# Patient Record
Sex: FEMALE | Race: WHITE | NOT HISPANIC OR LATINO | URBAN - METROPOLITAN AREA
[De-identification: names, ages, dates, MRNs, and addresses within clinical notes are randomized per-mention and may not be internally consistent; named-entity substitution may affect disease eponyms.]

---

## 2018-07-23 ENCOUNTER — HOSPITAL ENCOUNTER (OUTPATIENT)
Dept: FAMILY MEDICINE CLINIC | Facility: CLINIC | Age: 43
Setting detail: SPECIMEN
Discharge: HOME | End: 2018-07-23
Attending: FAMILY MEDICINE | Admitting: FAMILY MEDICINE

## 2018-07-23 LAB
ALBUMIN SERPL-MCNC: 3.9 G/DL (ref 3.5–4.8)
ALBUMIN/GLOB SERPL: 1.4 {RATIO} (ref 1–1.7)
ALP SERPL-CCNC: 48 IU/L (ref 32–91)
ALT SERPL-CCNC: 14 IU/L (ref 14–54)
ANION GAP SERPL CALC-SCNC: 9.4 MMOL/L (ref 10–20)
AST SERPL-CCNC: 16 IU/L (ref 15–41)
BASOPHILS # BLD AUTO: 0 10*3/UL (ref 0–0.2)
BASOPHILS NFR BLD AUTO: 0 % (ref 0–2)
BILIRUB SERPL-MCNC: 0.4 MG/DL (ref 0.3–1.2)
BUN SERPL-MCNC: 10 MG/DL (ref 8–20)
BUN/CREAT SERPL: 12.5 (ref 5.4–26.2)
CALCIUM SERPL-MCNC: 9.5 MG/DL (ref 8.9–10.3)
CHLORIDE SERPL-SCNC: 104 MMOL/L (ref 101–111)
CONV CO2: 31 MMOL/L (ref 22–32)
CONV TOTAL PROTEIN: 6.7 G/DL (ref 6.1–7.9)
CREAT UR-MCNC: 0.8 MG/DL (ref 0.4–1)
DIFFERENTIAL METHOD BLD: (no result)
EOSINOPHIL # BLD AUTO: 0.2 10*3/UL (ref 0–0.3)
EOSINOPHIL # BLD AUTO: 3 % (ref 0–3)
ERYTHROCYTE [DISTWIDTH] IN BLOOD BY AUTOMATED COUNT: 13.2 % (ref 11.5–14.5)
GLOBULIN UR ELPH-MCNC: 2.8 G/DL (ref 2.5–3.8)
GLUCOSE SERPL-MCNC: 83 MG/DL (ref 65–99)
HCT VFR BLD AUTO: 39.6 % (ref 35–49)
HGB BLD-MCNC: 13.5 G/DL (ref 12–15)
LYMPHOCYTES # BLD AUTO: 1.9 10*3/UL (ref 0.8–4.8)
LYMPHOCYTES NFR BLD AUTO: 26 % (ref 18–42)
MCH RBC QN AUTO: 32.4 PG (ref 26–32)
MCHC RBC AUTO-ENTMCNC: 34.1 G/DL (ref 32–36)
MCV RBC AUTO: 95 FL (ref 80–94)
MONOCYTES # BLD AUTO: 0.5 10*3/UL (ref 0.1–1.3)
MONOCYTES NFR BLD AUTO: 7 % (ref 2–11)
NEUTROPHILS # BLD AUTO: 4.7 10*3/UL (ref 2.3–8.6)
NEUTROPHILS NFR BLD AUTO: 64 % (ref 50–75)
NRBC BLD AUTO-RTO: 0 /100{WBCS}
NRBC/RBC NFR BLD MANUAL: 0 10*3/UL
PLATELET # BLD AUTO: 235 10*3/UL (ref 150–450)
PMV BLD AUTO: 8.8 FL (ref 7.4–10.4)
POTASSIUM SERPL-SCNC: 5.4 MMOL/L (ref 3.6–5.1)
RBC # BLD AUTO: 4.17 10*6/UL (ref 4–5.4)
SODIUM SERPL-SCNC: 139 MMOL/L (ref 136–144)
WBC # BLD AUTO: 7.4 10*3/UL (ref 4.5–11.5)

## 2019-08-26 ENCOUNTER — OFFICE VISIT (OUTPATIENT)
Dept: FAMILY MEDICINE CLINIC | Facility: CLINIC | Age: 44
End: 2019-08-26

## 2019-08-26 VITALS
SYSTOLIC BLOOD PRESSURE: 104 MMHG | DIASTOLIC BLOOD PRESSURE: 72 MMHG | TEMPERATURE: 98.3 F | BODY MASS INDEX: 24.11 KG/M2 | OXYGEN SATURATION: 99 % | HEIGHT: 62 IN | RESPIRATION RATE: 14 BRPM | WEIGHT: 131 LBS | HEART RATE: 74 BPM

## 2019-08-26 DIAGNOSIS — E55.9 VITAMIN D DEFICIENCY: ICD-10-CM

## 2019-08-26 DIAGNOSIS — R63.5 WEIGHT GAIN: ICD-10-CM

## 2019-08-26 DIAGNOSIS — M79.2 RADICULAR PAIN IN RIGHT ARM: Primary | ICD-10-CM

## 2019-08-26 DIAGNOSIS — Z00.00 WELLNESS EXAMINATION: ICD-10-CM

## 2019-08-26 DIAGNOSIS — F41.9 ANXIETY: ICD-10-CM

## 2019-08-26 PROBLEM — F31.9 BIPOLAR DISORDER (HCC): Status: ACTIVE | Noted: 2018-07-23

## 2019-08-26 PROBLEM — E87.5 HYPERKALEMIA: Status: ACTIVE | Noted: 2018-08-02

## 2019-08-26 PROBLEM — G47.9 SLEEP DISORDER: Status: ACTIVE | Noted: 2018-08-02

## 2019-08-26 PROBLEM — R53.83 FATIGUE: Status: ACTIVE | Noted: 2018-07-23

## 2019-08-26 PROBLEM — F32.A DEPRESSION: Status: ACTIVE | Noted: 2018-07-23

## 2019-08-26 PROCEDURE — 99213 OFFICE O/P EST LOW 20 MIN: CPT | Performed by: FAMILY MEDICINE

## 2019-08-26 RX ORDER — OLANZAPINE 5 MG/1
1 TABLET ORAL DAILY
COMMUNITY
Start: 2018-07-23 | End: 2019-08-26

## 2019-08-26 RX ORDER — DIVALPROEX SODIUM 125 MG/1
1 TABLET, DELAYED RELEASE ORAL 2 TIMES DAILY
COMMUNITY
Start: 2018-08-02 | End: 2019-08-26

## 2019-08-26 RX ORDER — DIVALPROEX SODIUM 500 MG/1
1 TABLET, DELAYED RELEASE ORAL DAILY
COMMUNITY
Start: 2018-07-23 | End: 2019-08-26

## 2019-08-26 RX ORDER — HYDROXYZINE HYDROCHLORIDE 25 MG/1
1 TABLET, FILM COATED ORAL 3 TIMES DAILY
COMMUNITY
Start: 2018-07-23 | End: 2019-08-26 | Stop reason: SDUPTHER

## 2019-08-26 RX ORDER — IBUPROFEN 200 MG
200 TABLET ORAL 4 TIMES DAILY
COMMUNITY
End: 2019-08-26 | Stop reason: SDUPTHER

## 2019-08-26 RX ORDER — BUPROPION HYDROCHLORIDE 100 MG/1
1 TABLET, EXTENDED RELEASE ORAL DAILY
COMMUNITY
Start: 2018-07-23 | End: 2019-08-26

## 2019-08-26 RX ORDER — IBUPROFEN 800 MG/1
800 TABLET ORAL EVERY 8 HOURS PRN
Qty: 90 TABLET | Refills: 1 | Status: SHIPPED | OUTPATIENT
Start: 2019-08-26 | End: 2019-10-15 | Stop reason: SDUPTHER

## 2019-08-26 RX ORDER — HYDROXYZINE HYDROCHLORIDE 25 MG/1
25 TABLET, FILM COATED ORAL EVERY 6 HOURS PRN
Qty: 90 TABLET | Refills: 1 | Status: SHIPPED | OUTPATIENT
Start: 2019-08-26 | End: 2019-11-13 | Stop reason: SDUPTHER

## 2019-08-26 NOTE — PROGRESS NOTES
"Subjective   Patito Merritt is a 43 y.o. female.     Chief Complaint   Patient presents with   • Elbow Pain       History of Present Illness   Pt has had worsening elbow pain  Hx old injury, lately has been popping to make \"buzzing\" sensation to right arm stop.  She has also  hadright shoulder pain and nuimbness in right hand.  Needs wellness labs, she tapered off meds for anxiety/ depression: takes atarax only with acute anxeity      The following portions of the patient's history were reviewed and updated as appropriate: allergies, current medications, past family history, past medical history, past social history, past surgical history and problem list.    Past Medical History:   Diagnosis Date   • Anxiety    • Bipolar disorder (CMS/HCC)    • Bipolar disorder (CMS/HCC)    • Depression    • Fatigue    • Hyperkalemia    • Migraine    • PTSD (post-traumatic stress disorder)    • Sleep disorder        Past Surgical History:   Procedure Laterality Date   • CLOSED REDUCTION RADIAL / ULNAR SHAFT FRACTURE  1999    radial fracture in right arm        Family History   Problem Relation Age of Onset   • Diabetes Mother    • Cancer Maternal Grandfather    • Cancer Paternal Grandfather        Review of Systems   Constitutional: Positive for unexpected weight gain.   Musculoskeletal:        Right shoulder pain  Right elbow pain  Numbness right arm  Carpal tunnel right hand       Objective   Physical Exam   Constitutional: She is oriented to person, place, and time. She appears well-developed and well-nourished. No distress.   HENT:   Head: Normocephalic and atraumatic.   Right Ear: External ear normal.   Left Ear: External ear normal.   Nose: Nose normal.   Mouth/Throat: Oropharynx is clear and moist.   Eyes: EOM are normal. Pupils are equal, round, and reactive to light.   Neck: Normal range of motion. Neck supple. No thyromegaly present.   Cardiovascular: Normal rate, regular rhythm and normal heart sounds. Exam reveals no " gallop and no friction rub.   No murmur heard.  Pulmonary/Chest: Effort normal. She has no wheezes.   Abdominal: Soft. There is no tenderness.   Musculoskeletal: Normal range of motion. She exhibits tenderness (right elbow (pops)). She exhibits no edema or deformity.   Right elbow tenderness, limited rom,right deltoid tenderness   Lymphadenopathy:     She has no cervical adenopathy.   Neurological: She is alert and oriented to person, place, and time. No cranial nerve deficit.   Skin: Skin is warm and dry. No rash noted. She is not diaphoretic.   Psychiatric: She has a normal mood and affect. Her behavior is normal. Judgment and thought content normal.   Nursing note and vitals reviewed.        Assessment/Plan   Patito was seen today for elbow pain.    Diagnoses and all orders for this visit:    Radicular pain in right arm  -     Ambulatory Referral to Orthopedic Surgery  -     ibuprofen (ADVIL,MOTRIN) 800 MG tablet; Take 1 tablet by mouth Every 8 (Eight) Hours As Needed for Mild Pain .    Wellness examination  -     CBC (No Diff); Future  -     Lipid Panel; Future  -     Vitamin D 25 Hydroxy; Future  -     Comprehensive Metabolic Panel; Future    Weight gain  -     TSH+Free T4; Future    Vitamin D deficiency  -     Vitamin D 25 Hydroxy; Future    Anxiety    Other orders  -     Gabapentin Enacarbil ER (HORIZANT) 300 MG tablet controlled-release; Take 1 tablet by mouth Daily.  -     hydrOXYzine (ATARAX) 25 MG tablet; Take 1 tablet by mouth Every 6 (Six) Hours As Needed for Itching.        Refer to ortho  Fu 6 weeks  Get labs before  Fu GYN Dr. Neal  Refill IBU and hydroxyzine  Rx Horizant 300           Vitals:    08/26/19 0900   BP: 104/72   Pulse: 74   Resp: 14   Temp: 98.3 °F (36.8 °C)   SpO2: 99%       No results found for: HGBA1C, POCGLU, LDL

## 2019-08-26 NOTE — PATIENT INSTRUCTIONS
Refer to ortho  Fu 6 weeks  Get labs before  Fu GYN Dr. Neal  Refill IBU and hydroxyzine  Rx Horizant 300

## 2019-09-13 ENCOUNTER — OFFICE VISIT (OUTPATIENT)
Dept: ORTHOPEDIC SURGERY | Facility: CLINIC | Age: 44
End: 2019-09-13

## 2019-09-13 VITALS
HEART RATE: 77 BPM | SYSTOLIC BLOOD PRESSURE: 104 MMHG | DIASTOLIC BLOOD PRESSURE: 61 MMHG | WEIGHT: 134.2 LBS | HEIGHT: 62 IN | BODY MASS INDEX: 24.69 KG/M2

## 2019-09-13 DIAGNOSIS — M25.521 PAIN IN RIGHT ELBOW: Primary | ICD-10-CM

## 2019-09-13 DIAGNOSIS — G56.31 RADIAL NEUROPATHY, RIGHT: ICD-10-CM

## 2019-09-13 PROCEDURE — 99213 OFFICE O/P EST LOW 20 MIN: CPT | Performed by: FAMILY MEDICINE

## 2019-09-13 NOTE — PROGRESS NOTES
"Primary Care Sports Medicine Office Visit Note     Patient ID: Patito Merritt is a 43 y.o. female.    Chief Complaint:  Chief Complaint   Patient presents with   • Right Arm - Initial Evaluation     HPI:    Ms. Patito Merritt is a 43 y.o. female who presents to the clinic today for evaluation of right lateral elbow pain.  She states that many years ago (1997) she had a fracture dislocation of her radial head.  She states this healed without issue, but ever since then is able to \"pop\" her right elbow.  She unfortunately for close to the last 6 months now has had some tingling and numbness of the lateral arm, and the radial nerve distribution, starting at the elbow.  She knows \"relocating\" her radial head with a pop will make the pain go away immediately.    Past Medical History:   Diagnosis Date   • Anxiety    • Anxiety disorder 7/23/2018   • Bipolar disorder (CMS/HCC)    • Bipolar disorder (CMS/HCC)    • Depression    • Fatigue    • Hyperkalemia    • Migraine    • PTSD (post-traumatic stress disorder)    • Sleep disorder        Past Surgical History:   Procedure Laterality Date   • CLOSED REDUCTION RADIAL / ULNAR SHAFT FRACTURE  1999    radial fracture in right arm        Family History   Problem Relation Age of Onset   • Diabetes Mother    • Cancer Maternal Grandfather    • Cancer Paternal Grandfather    • Hypertension Father      Social History     Occupational History   • Not on file   Tobacco Use   • Smoking status: Current Every Day Smoker   • Smokeless tobacco: Never Used   • Tobacco comment: 10 a day    Substance and Sexual Activity   • Alcohol use: No     Frequency: Never   • Drug use: No   • Sexual activity: Defer      Review of Systems   Constitutional: Negative for activity change and fever.   Respiratory: Negative for cough and shortness of breath.    Cardiovascular: Negative for chest pain.   Gastrointestinal: Negative for constipation, diarrhea, nausea and vomiting.   Musculoskeletal: Positive for " "arthralgias.   Skin: Negative for color change and rash.   Neurological: Positive for numbness. Negative for weakness.   Hematological: Does not bruise/bleed easily.       Objective:    /61 (BP Location: Left arm, Patient Position: Sitting, Cuff Size: Adult)   Pulse 77   Ht 157.5 cm (62\")   Wt 60.9 kg (134 lb 3.2 oz)   BMI 24.55 kg/m²     Physical Examination:  Physical Exam   Constitutional: She appears well-developed and well-nourished. No distress.   HENT:   Head: Normocephalic and atraumatic.   Eyes: Conjunctivae are normal.   Cardiovascular: Intact distal pulses.   Pulmonary/Chest: Effort normal. No respiratory distress.   Neurological: She is alert.   Skin: Skin is warm. Capillary refill takes less than 2 seconds. She is not diaphoretic.   Nursing note and vitals reviewed.    Right Elbow Exam     Tenderness   The patient is experiencing tenderness in the radial capitellar joint.     Range of Motion   The patient has normal right elbow ROM.    Muscle Strength   Pronation:  5/5   Supination:  5/5     Tests   Varus: negative  Valgus: negative    Other   Erythema: absent  Sensation: normal  Pulse: present    Comments:  + tinnels sign to radial tunnel          Imaging:  Two-view XR of the right elbow was negative for acute fracture, malalignment, dislocation today.    Assessment and Plan:    1. Pain in right elbow  - XR Elbow 2 View Right  - MRI Elbow Right Without Contrast; Future    2. Radial neuropathy, right  After discussing pathology and treatment options today, I recommended MRI for further evaluation of the entrapment site of the radial nerve in the lateral aspect of the elbow.  Her x-rays are relatively normal, but this does not rule out soft tissue entrapment.  We will get MRI right elbow without contrast, and follow-up in 4 weeks.  In that timeframe she can continue the gabapentin.  Once we have more information and could potentially have surgical discussion in the future, we can discuss this " "vs gabapentin efficacy for conservative management at follow-up visit. RTC 1 month.    Jakob KIRKLAND \"Chance\" Mahendra EAGLE, , CAQSM  09/13/19  1:44 PM    Disclaimer: Please note that areas of this note were completed with computer voice recognition software.  Quite often unanticipated grammatical, syntax, homophones, and other interpretive errors are inadvertently transcribed by the computer software. Please excuse any errors that have escaped final proofreading.  "

## 2019-09-21 ENCOUNTER — HOSPITAL ENCOUNTER (OUTPATIENT)
Dept: MRI IMAGING | Facility: HOSPITAL | Age: 44
Discharge: HOME | End: 2019-09-21
Admitting: FAMILY MEDICINE

## 2019-09-21 DIAGNOSIS — M25.521 PAIN IN RIGHT ELBOW: ICD-10-CM

## 2019-09-21 PROCEDURE — 73221 MRI JOINT UPR EXTREM W/O DYE: CPT

## 2019-09-25 DIAGNOSIS — G56.31 RADIAL NEUROPATHY, RIGHT: Primary | ICD-10-CM

## 2019-09-26 NOTE — PROGRESS NOTES
PATIENT IS SCHEDULED FOR 10/09/19 @10:30AM FOR @11:00AM EMG. SCHEDULED WITH Dr. SEIPEL'S OFFICE. CALLED AND NOTIFIED PATIENT OF RESULTS.

## 2019-10-01 ENCOUNTER — CLINICAL SUPPORT (OUTPATIENT)
Dept: FAMILY MEDICINE CLINIC | Facility: CLINIC | Age: 44
End: 2019-10-01

## 2019-10-01 DIAGNOSIS — R63.5 WEIGHT GAIN: ICD-10-CM

## 2019-10-01 DIAGNOSIS — Z00.00 WELLNESS EXAMINATION: ICD-10-CM

## 2019-10-01 DIAGNOSIS — E55.9 VITAMIN D DEFICIENCY: ICD-10-CM

## 2019-10-01 LAB
ALBUMIN SERPL-MCNC: 4.1 G/DL (ref 3.5–4.8)
ALBUMIN/GLOB SERPL: 1.6 G/DL (ref 1–1.7)
ALP SERPL-CCNC: 54 U/L (ref 32–91)
ALT SERPL W P-5'-P-CCNC: 19 U/L (ref 14–54)
ANION GAP SERPL CALCULATED.3IONS-SCNC: 12 MMOL/L (ref 5–15)
ARTICHOKE IGE QN: 170 MG/DL (ref 0–100)
AST SERPL-CCNC: 16 U/L (ref 15–41)
BILIRUB SERPL-MCNC: 0.5 MG/DL (ref 0.3–1.2)
BUN BLD-MCNC: 12 MG/DL (ref 8–20)
BUN/CREAT SERPL: 15 (ref 5.4–26.2)
CALCIUM SPEC-SCNC: 9.6 MG/DL (ref 8.9–10.3)
CHLORIDE SERPL-SCNC: 102 MMOL/L (ref 101–111)
CHOLEST SERPL-MCNC: 228 MG/DL
CO2 SERPL-SCNC: 28 MMOL/L (ref 22–32)
CREAT BLD-MCNC: 0.8 MG/DL (ref 0.4–1)
DEPRECATED RDW RBC AUTO: 43.8 FL (ref 37–54)
ERYTHROCYTE [DISTWIDTH] IN BLOOD BY AUTOMATED COUNT: 13.2 % (ref 12.3–15.4)
GFR SERPL CREATININE-BSD FRML MDRD: 79 ML/MIN/1.73
GLOBULIN UR ELPH-MCNC: 2.6 GM/DL (ref 2.5–3.8)
GLUCOSE BLD-MCNC: 92 MG/DL (ref 65–99)
HCT VFR BLD AUTO: 42.5 % (ref 34–46.6)
HDLC SERPL QL: 3.93
HDLC SERPL-MCNC: 58 MG/DL
HGB BLD-MCNC: 14.5 G/DL (ref 12–15.9)
LDLC/HDLC SERPL: 2.72 {RATIO}
MCH RBC QN AUTO: 32.5 PG (ref 26.6–33)
MCHC RBC AUTO-ENTMCNC: 34 G/DL (ref 31.5–35.7)
MCV RBC AUTO: 95.5 FL (ref 79–97)
PLATELET # BLD AUTO: 293 10*3/MM3 (ref 140–450)
PMV BLD AUTO: 8.3 FL (ref 6–12)
POTASSIUM BLD-SCNC: 5 MMOL/L (ref 3.6–5.1)
PROT SERPL-MCNC: 6.7 G/DL (ref 6.1–7.9)
RBC # BLD AUTO: 4.45 10*6/MM3 (ref 3.77–5.28)
SODIUM BLD-SCNC: 137 MMOL/L (ref 136–144)
T4 FREE SERPL-MCNC: 0.73 NG/DL (ref 0.58–1.64)
TRIGL SERPL-MCNC: 62 MG/DL
TSH SERPL DL<=0.05 MIU/L-ACNC: 1.5 UIU/ML (ref 0.34–5.6)
VLDLC SERPL-MCNC: 12.4 MG/DL
WBC NRBC COR # BLD: 9.1 10*3/MM3 (ref 3.4–10.8)

## 2019-10-01 PROCEDURE — 36415 COLL VENOUS BLD VENIPUNCTURE: CPT

## 2019-10-01 PROCEDURE — 36415 COLL VENOUS BLD VENIPUNCTURE: CPT | Performed by: FAMILY MEDICINE

## 2019-10-01 PROCEDURE — 82306 VITAMIN D 25 HYDROXY: CPT | Performed by: FAMILY MEDICINE

## 2019-10-01 PROCEDURE — 85027 COMPLETE CBC AUTOMATED: CPT | Performed by: FAMILY MEDICINE

## 2019-10-01 PROCEDURE — 80061 LIPID PANEL: CPT | Performed by: FAMILY MEDICINE

## 2019-10-01 PROCEDURE — 80053 COMPREHEN METABOLIC PANEL: CPT | Performed by: FAMILY MEDICINE

## 2019-10-01 PROCEDURE — 84443 ASSAY THYROID STIM HORMONE: CPT | Performed by: FAMILY MEDICINE

## 2019-10-01 PROCEDURE — 84439 ASSAY OF FREE THYROXINE: CPT | Performed by: FAMILY MEDICINE

## 2019-10-02 LAB — 25(OH)D3 SERPL-MCNC: 33.7 NG/ML (ref 30–100)

## 2019-10-07 ENCOUNTER — OFFICE VISIT (OUTPATIENT)
Dept: FAMILY MEDICINE CLINIC | Facility: CLINIC | Age: 44
End: 2019-10-07

## 2019-10-07 VITALS
DIASTOLIC BLOOD PRESSURE: 65 MMHG | OXYGEN SATURATION: 100 % | SYSTOLIC BLOOD PRESSURE: 101 MMHG | HEART RATE: 69 BPM | HEIGHT: 62 IN | BODY MASS INDEX: 25.88 KG/M2 | WEIGHT: 140.6 LBS | TEMPERATURE: 98.3 F | RESPIRATION RATE: 18 BRPM

## 2019-10-07 DIAGNOSIS — M79.601 PAIN OF RIGHT UPPER EXTREMITY: Primary | ICD-10-CM

## 2019-10-07 DIAGNOSIS — Z12.39 SCREENING FOR BREAST CANCER: ICD-10-CM

## 2019-10-07 DIAGNOSIS — F17.200 TOBACCO USE DISORDER: ICD-10-CM

## 2019-10-07 PROCEDURE — 99214 OFFICE O/P EST MOD 30 MIN: CPT | Performed by: FAMILY MEDICINE

## 2019-10-07 RX ORDER — NICOTINE 21 MG/24HR
1 PATCH, TRANSDERMAL 24 HOURS TRANSDERMAL EVERY 24 HOURS
Qty: 28 PATCH | Refills: 2 | OUTPATIENT
Start: 2019-10-07 | End: 2021-01-07

## 2019-10-07 RX ORDER — GABAPENTIN 100 MG/1
100 CAPSULE ORAL 2 TIMES DAILY
Qty: 60 CAPSULE | Refills: 2 | Status: SHIPPED | OUTPATIENT
Start: 2019-10-07 | End: 2019-12-09 | Stop reason: SDUPTHER

## 2019-10-07 RX ORDER — PRAVASTATIN SODIUM 20 MG
20 TABLET ORAL NIGHTLY
Qty: 30 TABLET | Refills: 2 | Status: SHIPPED | OUTPATIENT
Start: 2019-10-07 | End: 2020-11-02

## 2019-10-07 NOTE — PROGRESS NOTES
Subjective   Patito Merritt is a 43 y.o. female.     Chief Complaint   Patient presents with   • Follow-up   • Annual Exam   • Arm Pain     rt       History of Present Illness   Hx right elbow fracture about 20 years ago.  She started having discomfort, increasing numbness that has progressed over the past 5 years. She now has almost daily pain and numbness.  She did get some relief from horizant which wasn't covered.  She did see sports medicine and had a normal MRI , now scheduled for NCS,EMG  Fu lab: [facility]    The following portions of the patient's history were reviewed and updated as appropriate: allergies, current medications, past family history, past medical history, past social history, past surgical history and problem list.    Past Medical History:   Diagnosis Date   • Anxiety    • Anxiety disorder 7/23/2018   • Bipolar disorder (CMS/HCC)    • Bipolar disorder (CMS/HCC)    • Depression    • Fatigue    • Hyperkalemia    • Migraine    • PTSD (post-traumatic stress disorder)    • Sleep disorder        Past Surgical History:   Procedure Laterality Date   • CLOSED REDUCTION RADIAL / ULNAR SHAFT FRACTURE  1999    radial fracture in right arm        Family History   Problem Relation Age of Onset   • Diabetes Mother    • Cancer Maternal Grandfather    • Cancer Paternal Grandfather    • Hypertension Father        Review of Systems    Objective   Physical Exam   Constitutional: She is oriented to person, place, and time. She appears well-developed and well-nourished. No distress.   HENT:   Head: Normocephalic and atraumatic.   Right Ear: External ear normal.   Left Ear: External ear normal.   Nose: Nose normal.   Mouth/Throat: Oropharynx is clear and moist.   Eyes: EOM are normal. Pupils are equal, round, and reactive to light.   Neck: Normal range of motion. Neck supple. No thyromegaly present.   Cardiovascular: Normal rate, regular rhythm and normal heart sounds. Exam reveals no gallop and no friction rub.   No  murmur heard.  Pulmonary/Chest: Effort normal. She has no wheezes.   Abdominal: Soft. There is no tenderness.   Musculoskeletal: Normal range of motion. She exhibits no edema, tenderness or deformity.   Lymphadenopathy:     She has no cervical adenopathy.   Neurological: She is alert and oriented to person, place, and time. No cranial nerve deficit.   Skin: Skin is warm and dry. No rash noted. She is not diaphoretic.   Psychiatric: She has a normal mood and affect. Her behavior is normal. Judgment and thought content normal.   Nursing note and vitals reviewed.        Assessment/Plan   Patito was seen today for follow-up, annual exam and arm pain.    Diagnoses and all orders for this visit:    Pain of right upper extremity                 Vitals:    10/07/19 0930   BP: 101/65   Pulse: 69   Resp: 18   Temp: 98.3 °F (36.8 °C)   SpO2: 100%       LDL Cholesterol    Date Value Ref Range Status   10/01/2019 170 (H) 0 - 100 mg/dL Final

## 2019-10-07 NOTE — PATIENT INSTRUCTIONS
Cholesterol  Cholesterol is a white, waxy, fat-like substance that is needed by the human body in small amounts. The liver makes all the cholesterol we need. Cholesterol is carried from the liver by the blood through the blood vessels. Deposits of cholesterol (plaques) may build up on blood vessel (artery) walls. Plaques make the arteries narrower and stiffer. Cholesterol plaques increase the risk for heart attack and stroke.  You cannot feel your cholesterol level even if it is very high. The only way to know that it is high is to have a blood test. Once you know your cholesterol levels, you should keep a record of the test results. Work with your health care provider to keep your levels in the desired range.  What do the results mean?  · Total cholesterol is a rough measure of all the cholesterol in your blood.  · LDL (low-density lipoprotein) is the “bad” cholesterol. This is the type that causes plaque to build up on the artery walls. You want this level to be low.  · HDL (high-density lipoprotein) is the “good” cholesterol because it cleans the arteries and carries the LDL away. You want this level to be high.  · Triglycerides are fat that the body can either burn for energy or store. High levels are closely linked to heart disease.  What are the desired levels of cholesterol?  · Total cholesterol below 200.  · LDL below 100 for people who are at risk, below 70 for people at very high risk.  · HDL above 40 is good. A level of 60 or higher is considered to be protective against heart disease.  · Triglycerides below 150.  How can I lower my cholesterol?  Diet  Follow your diet program as told by your health care provider.  · Choose fish or white meat chicken and turkey, roasted or baked. Limit fatty cuts of red meat, fried foods, and processed meats, such as sausage and lunch meats.  · Eat lots of fresh fruits and vegetables.  · Choose whole grains, beans, pasta, potatoes, and cereals.  · Choose olive oil, corn  oil, or canola oil, and use only small amounts.  · Avoid butter, mayonnaise, shortening, or palm kernel oils.  · Avoid foods with trans fats.  · Drink skim or nonfat milk and eat low-fat or nonfat yogurt and cheeses. Avoid whole milk, cream, ice cream, egg yolks, and full-fat cheeses.  · Healthier desserts include claire food cake, thong snaps, animal crackers, hard candy, popsicles, and low-fat or nonfat frozen yogurt. Avoid pastries, cakes, pies, and cookies.    Exercise  · Follow your exercise program as told by your health care provider. A regular program:  ? Helps to decrease LDL and raise HDL.  ? Helps with weight control.  · Do things that increase your activity level, such as gardening, walking, and taking the stairs.  · Ask your health care provider about ways that you can be more active in your daily life.  Medicine  · Take over-the-counter and prescription medicines only as told by your health care provider.  ? Medicine may be prescribed by your health care provider to help lower cholesterol and decrease the risk for heart disease. This is usually done if diet and exercise have failed to bring down cholesterol levels.  ? If you have several risk factors, you may need medicine even if your levels are normal.  This information is not intended to replace advice given to you by your health care provider. Make sure you discuss any questions you have with your health care provider.  Document Released: 09/12/2002 Document Revised: 07/15/2017 Document Reviewed: 06/17/2017  [employer] Patient Education © 2019 [employer]        Keep fu with sports medcine  Smoking cessation encouraged  Schedule mammogra  Keep fu with  GYN, Dr. Neal

## 2019-10-15 DIAGNOSIS — M79.2 RADICULAR PAIN IN RIGHT ARM: ICD-10-CM

## 2019-10-16 RX ORDER — IBUPROFEN 800 MG/1
800 TABLET ORAL EVERY 8 HOURS PRN
Qty: 90 TABLET | Refills: 1 | Status: SHIPPED | OUTPATIENT
Start: 2019-10-16 | End: 2020-06-25 | Stop reason: SINTOL

## 2019-10-17 ENCOUNTER — PROCEDURE VISIT (OUTPATIENT)
Dept: NEUROLOGY | Facility: CLINIC | Age: 44
End: 2019-10-17

## 2019-10-17 DIAGNOSIS — G56.31 RADIAL NEUROPATHY, RIGHT: Primary | ICD-10-CM

## 2019-10-17 PROCEDURE — 95886 MUSC TEST DONE W/N TEST COMP: CPT | Performed by: PSYCHIATRY & NEUROLOGY

## 2019-10-17 PROCEDURE — 95909 NRV CNDJ TST 5-6 STUDIES: CPT | Performed by: PSYCHIATRY & NEUROLOGY

## 2019-10-17 NOTE — PROGRESS NOTES
EMG and Nerve Conduction Studies    The complete report includes the data sheets.     Referring Doctor: Jakob Mccray II, DO      Indication: RUE/ Radial Neuropathy Right    History: Radial nerve distribution forearm and elbow pain      Results:    1.  The right median sensory and motor conduction studies were normal.    2.  The right ulnar sensory motor conduction studies were normal.    3.  The right radial sensory conduction study was normal.    4.  The right radial motor distal latency and forearm conduction velocity was normal.  The across elbow segment of the radial nerve conduction velocity was slightly recently reduced below the lower limit of normal of 49 at 46 m/s.    5.  EMG of the muscles examined in the right upper extremity including the radial innervated extensor indices and extensor digitorum muscles were normal.    Impression:    This is a mildly abnormal study due to slow conduction velocity across elbow segment of the radial nerve however EMG of the muscle innervated by the radial nerves were normal.        Joseph Seipel, M.D.

## 2019-10-18 ENCOUNTER — OFFICE VISIT (OUTPATIENT)
Dept: ORTHOPEDIC SURGERY | Facility: CLINIC | Age: 44
End: 2019-10-18

## 2019-10-18 VITALS
HEIGHT: 62 IN | HEART RATE: 82 BPM | SYSTOLIC BLOOD PRESSURE: 103 MMHG | BODY MASS INDEX: 25.95 KG/M2 | DIASTOLIC BLOOD PRESSURE: 69 MMHG | WEIGHT: 141 LBS

## 2019-10-18 DIAGNOSIS — G56.31 RADIAL TUNNEL SYNDROME, RIGHT: Primary | ICD-10-CM

## 2019-10-18 PROCEDURE — 99213 OFFICE O/P EST LOW 20 MIN: CPT | Performed by: FAMILY MEDICINE

## 2019-10-22 NOTE — PROGRESS NOTES
Primary Care Sports Medicine Office Visit Note     Patient ID: Patito Merritt is a 43 y.o. female.    Chief Complaint:  Chief Complaint   Patient presents with   • Right Elbow - Follow-up     HPI:    Ms. Patito Merritt is a 43 y.o. female who presents to the clinic today for EMG/nerve conduction study follow-up.  She states that since previous visit, she has seen her PCP, who increased her gabapentin less than a week ago.  She has not had time to acclimate to this dose yet.  Otherwise she states the lateral elbow pain and tingling has mildly improved.  To this point, a MRI was essentially negative for any obvious nervous compression or edema within the nerve.  EMG results today are below.  Otherwise she continues to have some deep aching pain and occasionally shooting sharp pain with numbness down the radial aspect of her right forearm, radiating from her elbow.    Past Medical History:   Diagnosis Date   • Anxiety    • Anxiety disorder 7/23/2018   • Bipolar disorder (CMS/HCC)    • Bipolar disorder (CMS/HCC)    • Depression    • Fatigue    • Hyperkalemia    • Migraine    • PTSD (post-traumatic stress disorder)    • Sleep disorder        Past Surgical History:   Procedure Laterality Date   • CLOSED REDUCTION RADIAL / ULNAR SHAFT FRACTURE  1999    radial fracture in right arm        Family History   Problem Relation Age of Onset   • Diabetes Mother    • Cancer Maternal Grandfather    • Cancer Paternal Grandfather    • Hypertension Father      Social History     Occupational History   • Not on file   Tobacco Use   • Smoking status: Current Every Day Smoker   • Smokeless tobacco: Never Used   • Tobacco comment: 10 a day    Substance and Sexual Activity   • Alcohol use: No     Frequency: Never   • Drug use: No   • Sexual activity: Defer      Review of Systems   Constitutional: Negative for activity change and fever.   Respiratory: Negative for cough and shortness of breath.    Cardiovascular: Negative for chest pain.  "  Gastrointestinal: Negative for constipation, diarrhea, nausea and vomiting.   Musculoskeletal: Positive for arthralgias.   Skin: Negative for color change and rash.   Neurological: Positive for numbness. Negative for weakness.   Hematological: Does not bruise/bleed easily.       Objective:    /69 (BP Location: Right arm, Patient Position: Sitting, Cuff Size: Adult)   Pulse 82   Ht 157.5 cm (62\")   Wt 64 kg (141 lb)   BMI 25.79 kg/m²     Physical Examination:  Physical Exam   Constitutional: She appears well-developed and well-nourished. No distress.   HENT:   Head: Normocephalic and atraumatic.   Eyes: Conjunctivae are normal.   Cardiovascular: Intact distal pulses.   Pulmonary/Chest: Effort normal. No respiratory distress.   Neurological: She is alert.   Skin: Skin is warm. Capillary refill takes less than 2 seconds. She is not diaphoretic.   Nursing note and vitals reviewed.    Right Elbow Exam     Tenderness   The patient is experiencing no tenderness.     Range of Motion   Extension: normal   Flexion: normal   Pronation: normal   Supination: normal     Other   Erythema: absent  Sensation: normal  Pulse: present    Comments:  Tinel's positive at the radial tunnel in the lateral elbow.          Imaging and other tests:  EMG/nerve conduction study dated 10/17/2019 IMPRESSION:  Impression:     This is a mildly abnormal study due to slow conduction velocity across elbow segment of the radial nerve however EMG of the muscle innervated by the radial nerves were normal.    Assessment and Plan:    1. Radial tunnel syndrome, right    I discussed with the patient today that ultimately though nerve conduction study shows mild abnormality, there is no anatomic reason for her to have a conduction delay.  I feel that the gabapentin she is receiving from PCP should likely help this issue.  Also recommended she discontinue any repetitive use or motions about the elbow if not necessary.  Rest, conservative measures.  " "If gabapentin does not completely relieve her pain over the next 1 month, like to see her back.  We can potentially discuss surgical options at that time if warranted.    Jakob KIRKLAND \"Chance\" Mahendra EAGLE DO, CAQSM  10/22/19  1:20 PM    Disclaimer: Please note that areas of this note were completed with computer voice recognition software.  Quite often unanticipated grammatical, syntax, homophones, and other interpretive errors are inadvertently transcribed by the computer software. Please excuse any errors that have escaped final proofreading.  "

## 2019-11-13 RX ORDER — HYDROXYZINE HYDROCHLORIDE 25 MG/1
TABLET, FILM COATED ORAL
Qty: 90 TABLET | Refills: 1 | Status: SHIPPED | OUTPATIENT
Start: 2019-11-13 | End: 2019-12-09 | Stop reason: SDUPTHER

## 2019-11-13 NOTE — TELEPHONE ENCOUNTER
Last visit:  10/7/19  Next visit: none  Last labs:10/1/19    Rx requested: hydrOXYzine  Pharmacy: [facility] in [place]

## 2019-11-18 ENCOUNTER — OFFICE VISIT (OUTPATIENT)
Dept: ORTHOPEDIC SURGERY | Facility: CLINIC | Age: 44
End: 2019-11-18

## 2019-11-18 VITALS — HEIGHT: 62 IN | BODY MASS INDEX: 26.68 KG/M2 | WEIGHT: 145 LBS

## 2019-11-18 DIAGNOSIS — G56.31 RADIAL TUNNEL SYNDROME OF RIGHT UPPER EXTREMITY: Primary | ICD-10-CM

## 2019-11-18 PROCEDURE — 99213 OFFICE O/P EST LOW 20 MIN: CPT | Performed by: FAMILY MEDICINE

## 2019-11-18 NOTE — PROGRESS NOTES
Primary Care Sports Medicine Office Visit Note     Patient ID: Patito Merritt is a 43 y.o. female.    Chief Complaint:  Chief Complaint   Patient presents with   • Right Elbow - Follow-up     HPI:    Ms. Patito Merritt is a 43 y.o. female who presents to the clinic today for follow-up of right elbow pain.  Since her last visit, we confirmed radial tunnel syndrome with normal MRI, but positive nerve conduction study.  She is not showing any downstream negative effects, and only very mildly abnormal EMG.  No weakness.  Only very rare sharp and shooting pains in this nerve distribution previously.  Since that visit one month ago, she has increased her gabapentin dosage and now returned.  She states dated today she is doing incredibly well without any pain or problems on most days.  She has had 1-2 days in the last 1 month with heavy activity (moved a bunch of boxes over the weekend), that seemed to have flared up her elbow pain mildly.  Otherwise she seems to be doing very well today without any problems or complaints with new dose of gabapentin only.    Past Medical History:   Diagnosis Date   • Anxiety    • Anxiety disorder 7/23/2018   • Bipolar disorder (CMS/HCC)    • Bipolar disorder (CMS/HCC)    • Depression    • Fatigue    • Hyperkalemia    • Migraine    • PTSD (post-traumatic stress disorder)    • Sleep disorder        Past Surgical History:   Procedure Laterality Date   • CLOSED REDUCTION RADIAL / ULNAR SHAFT FRACTURE  1999    radial fracture in right arm        Family History   Problem Relation Age of Onset   • Diabetes Mother    • Cancer Maternal Grandfather    • Cancer Paternal Grandfather    • Hypertension Father      Social History     Occupational History   • Not on file   Tobacco Use   • Smoking status: Current Every Day Smoker   • Smokeless tobacco: Never Used   • Tobacco comment: 10 a day    Substance and Sexual Activity   • Alcohol use: No     Frequency: Never   • Drug use: No   • Sexual activity:  "Defer      Review of Systems   Constitutional: Negative for activity change and fever.   Respiratory: Negative for cough and shortness of breath.    Cardiovascular: Negative for chest pain.   Gastrointestinal: Negative for constipation, diarrhea, nausea and vomiting.   Musculoskeletal: Positive for arthralgias.   Skin: Negative for color change and rash.   Neurological: Negative for weakness.   Hematological: Does not bruise/bleed easily.       Objective:    Ht 157.5 cm (62\")   Wt 65.8 kg (145 lb)   BMI 26.52 kg/m²     Physical Examination:  Physical Exam   Constitutional: She appears well-developed and well-nourished. No distress.   HENT:   Head: Normocephalic and atraumatic.   Eyes: Conjunctivae are normal.   Cardiovascular: Intact distal pulses.   Pulmonary/Chest: Effort normal. No respiratory distress.   Neurological: She is alert.   Skin: Skin is warm. Capillary refill takes less than 2 seconds. She is not diaphoretic.   Nursing note and vitals reviewed.    Right Elbow Exam     Range of Motion   The patient has normal right elbow ROM.          Imaging and other tests:  No new imaging.     Assessment and Plan:    1. Radial tunnel syndrome of right upper extremity    I discussed with this patient that ultimately if gabapentin is alleviating this issue and she has no other pain or problems, we can continue this route conservatively if she would like.  She was given surgical options today.  She elects to proceed with conservative measures at this time.  RTC if she would like to consider ulnar nerve transposition, she can always see my surgical partner here Dr. Elton Broderick.  RTC in 6 months for continued monitoring of this issue.    Jakob KIRKLAND \"Chance\" Mahendar EAGLE DO, CAQSM  11/18/19  10:35 AM    Disclaimer: Please note that areas of this note were completed with computer voice recognition software.  Quite often unanticipated grammatical, syntax, homophones, and other interpretive errors are inadvertently " transcribed by the computer software. Please excuse any errors that have escaped final proofreading.

## 2019-12-09 DIAGNOSIS — M79.2 RADICULAR PAIN IN RIGHT ARM: ICD-10-CM

## 2019-12-09 DIAGNOSIS — M79.601 PAIN OF RIGHT UPPER EXTREMITY: ICD-10-CM

## 2019-12-10 RX ORDER — HYDROXYZINE HYDROCHLORIDE 25 MG/1
25 TABLET, FILM COATED ORAL EVERY 6 HOURS PRN
Qty: 90 TABLET | Refills: 1 | Status: SHIPPED | OUTPATIENT
Start: 2019-12-10 | End: 2020-06-25 | Stop reason: SDUPTHER

## 2019-12-10 RX ORDER — GABAPENTIN 100 MG/1
100 CAPSULE ORAL 2 TIMES DAILY
Qty: 60 CAPSULE | Refills: 2 | Status: SHIPPED | OUTPATIENT
Start: 2019-12-10 | End: 2020-06-08

## 2020-03-10 ENCOUNTER — APPOINTMENT (OUTPATIENT)
Dept: MAMMOGRAPHY | Facility: HOSPITAL | Age: 45
End: 2020-03-10

## 2020-06-07 DIAGNOSIS — M79.601 PAIN OF RIGHT UPPER EXTREMITY: ICD-10-CM

## 2020-06-08 RX ORDER — GABAPENTIN 100 MG/1
CAPSULE ORAL
Qty: 60 CAPSULE | Refills: 0 | Status: SHIPPED | OUTPATIENT
Start: 2020-06-08 | End: 2020-06-25 | Stop reason: DRUGHIGH

## 2020-06-25 ENCOUNTER — OFFICE VISIT (OUTPATIENT)
Dept: FAMILY MEDICINE CLINIC | Facility: CLINIC | Age: 45
End: 2020-06-25

## 2020-06-25 VITALS
HEART RATE: 70 BPM | HEIGHT: 62 IN | SYSTOLIC BLOOD PRESSURE: 90 MMHG | BODY MASS INDEX: 26.13 KG/M2 | DIASTOLIC BLOOD PRESSURE: 60 MMHG | RESPIRATION RATE: 16 BRPM | OXYGEN SATURATION: 98 % | WEIGHT: 142 LBS | TEMPERATURE: 97.3 F

## 2020-06-25 DIAGNOSIS — F43.9 STRESS AT HOME: ICD-10-CM

## 2020-06-25 DIAGNOSIS — M25.521 ARTHRALGIA OF RIGHT ELBOW: ICD-10-CM

## 2020-06-25 DIAGNOSIS — F41.9 ANXIETY: Primary | ICD-10-CM

## 2020-06-25 PROCEDURE — 99214 OFFICE O/P EST MOD 30 MIN: CPT | Performed by: FAMILY MEDICINE

## 2020-06-25 RX ORDER — HYDROXYZINE HYDROCHLORIDE 25 MG/1
25 TABLET, FILM COATED ORAL EVERY 6 HOURS PRN
Qty: 120 TABLET | Refills: 2 | Status: SHIPPED | OUTPATIENT
Start: 2020-06-25 | End: 2020-11-02 | Stop reason: SDUPTHER

## 2020-06-25 RX ORDER — CELECOXIB 100 MG/1
100 CAPSULE ORAL 2 TIMES DAILY PRN
Qty: 60 CAPSULE | Refills: 2 | Status: SHIPPED | OUTPATIENT
Start: 2020-06-25 | End: 2020-11-02 | Stop reason: SDUPTHER

## 2020-06-25 RX ORDER — DULOXETIN HYDROCHLORIDE 30 MG/1
30 CAPSULE, DELAYED RELEASE ORAL DAILY
Qty: 30 CAPSULE | Refills: 2 | Status: SHIPPED | OUTPATIENT
Start: 2020-06-25 | End: 2020-11-04 | Stop reason: SDUPTHER

## 2020-06-25 NOTE — PROGRESS NOTES
Subjective   Patito Merritt is a 44 y.o. female.     Chief Complaint   Patient presents with   • Med Refill     gabapentin   • Depression   • Anxiety       History of Present Illness   Pt is an essential worker, restaturant manager  No known sick sxposures, following pandemic CDC guidelines  Has had increased stressors to due pandemic, work duties  Also increased stressors at home  Needs refills of meds  Had had to increase hydroxyzine  Taking IBU for aches and pains    The following portions of the patient's history were reviewed and updated as appropriate: allergies, current medications, past family history, past medical history, past social history, past surgical history and problem list.    Past Medical History:   Diagnosis Date   • Anxiety    • Anxiety disorder 7/23/2018   • Bipolar disorder (CMS/HCC)    • Bipolar disorder (CMS/HCC)    • Depression    • Fatigue    • Hyperkalemia    • Migraine    • PTSD (post-traumatic stress disorder)    • Sleep disorder        Past Surgical History:   Procedure Laterality Date   • CLOSED REDUCTION RADIAL / ULNAR SHAFT FRACTURE  1999    radial fracture in right arm        Family History   Problem Relation Age of Onset   • Diabetes Mother    • Cancer Maternal Grandfather    • Cancer Paternal Grandfather    • Hypertension Father        Review of Systems   Constitutional: Negative for fever.   HENT: Negative for congestion.    Respiratory: Negative for cough and shortness of breath.    Gastrointestinal: Negative for diarrhea.   Psychiatric/Behavioral: Positive for depressed mood and stress. The patient is nervous/anxious.        Objective   Physical Exam   Constitutional: She is oriented to person, place, and time. She appears well-developed and well-nourished. No distress.   HENT:   Head: Normocephalic and atraumatic.   Right Ear: External ear normal.   Left Ear: External ear normal.   Nose: Nose normal.   Mouth/Throat: Oropharynx is clear and moist.   Eyes: Pupils are  equal, round, and reactive to light. EOM are normal.   Neck: Normal range of motion. Neck supple. No thyromegaly present.   Cardiovascular: Normal rate, regular rhythm and normal heart sounds. Exam reveals no gallop and no friction rub.   No murmur heard.  Pulmonary/Chest: Effort normal. She has no wheezes.   Abdominal: Soft. There is no tenderness.   Musculoskeletal: Normal range of motion. She exhibits no edema, tenderness or deformity.   Lymphadenopathy:     She has no cervical adenopathy.   Neurological: She is alert and oriented to person, place, and time. No cranial nerve deficit.   Skin: Skin is warm and dry. No rash noted. She is not diaphoretic.   Psychiatric: She has a normal mood and affect. Her behavior is normal. Judgment and thought content normal.   Nursing note and vitals reviewed.    Vitals:    06/25/20 1546   BP: 90/60   Pulse: 70   Resp: 16   Temp: 97.3 °F (36.3 °C)   SpO2: 98%     Body mass index is 25.97 kg/m².    LDL Cholesterol    Date Value Ref Range Status   10/01/2019 170 (H) 0 - 100 mg/dL Final     TSH   Date Value Ref Range Status   10/01/2019 1.500 0.340 - 5.600 uIU/mL Final     Comment:     Results may be falsely decreased if patient taking Biotin.     Results for orders placed or performed in visit on 10/01/19   Vitamin D 25 Hydroxy   Result Value Ref Range    25 Hydroxy, Vitamin D 33.7 30.0 - 100.0 ng/ml   CBC (No Diff)   Result Value Ref Range    WBC 9.10 3.40 - 10.80 10*3/mm3    RBC 4.45 3.77 - 5.28 10*6/mm3    Hemoglobin 14.5 12.0 - 15.9 g/dL    Hematocrit 42.5 34.0 - 46.6 %    MCV 95.5 79.0 - 97.0 fL    MCH 32.5 26.6 - 33.0 pg    MCHC 34.0 31.5 - 35.7 g/dL    RDW 13.2 12.3 - 15.4 %    RDW-SD 43.8 37.0 - 54.0 fl    MPV 8.3 6.0 - 12.0 fL    Platelets 293 140 - 450 10*3/mm3   TSH   Result Value Ref Range    TSH 1.500 0.340 - 5.600 uIU/mL   T4, Free   Result Value Ref Range    Free T4 0.73 0.58 - 1.64 ng/dL   Lipid Panel   Result Value Ref Range    Total Cholesterol 228 (H) <=200  mg/dL    Triglycerides 62 <=150 mg/dL    HDL Cholesterol 58 >=39 mg/dL    LDL Cholesterol  170 (H) 0 - 100 mg/dL    VLDL Cholesterol 12.4 mg/dL    LDL/HDL Ratio 2.72     Chol/HDL Ratio 3.93    Comprehensive Metabolic Panel   Result Value Ref Range    Glucose 92 65 - 99 mg/dL    BUN 12 8 - 20 mg/dL    Creatinine 0.80 0.40 - 1.00 mg/dL    Sodium 137 136 - 144 mmol/L    Potassium 5.0 3.6 - 5.1 mmol/L    Chloride 102 101 - 111 mmol/L    CO2 28.0 22.0 - 32.0 mmol/L    Calcium 9.6 8.9 - 10.3 mg/dL    Total Protein 6.7 6.1 - 7.9 g/dL    Albumin 4.10 3.50 - 4.80 g/dL    ALT (SGPT) 19 14 - 54 U/L    AST (SGOT) 16 15 - 41 U/L    Alkaline Phosphatase 54 32 - 91 U/L    Total Bilirubin 0.5 0.3 - 1.2 mg/dL    eGFR Non African Amer 79 >60 mL/min/1.73    Globulin 2.6 2.5 - 3.8 gm/dL    A/G Ratio 1.6 1.0 - 1.7 g/dL    BUN/Creatinine Ratio 15.0 5.4 - 26.2    Anion Gap 12.0 5.0 - 15.0 mmol/L       Assessment/Plan   Patito was seen today for med refill, depression and anxiety.    Diagnoses and all orders for this visit:    Anxiety    Stress at home    Arthralgia of right elbow    Other orders  -     Gabapentin Enacarbil  MG tablet controlled-release; Take 1 tablet by mouth 2 (two) times a day.  -     hydrOXYzine (ATARAX) 25 MG tablet; Take 1 tablet by mouth Every 6 (Six) Hours As Needed for Anxiety.  -     DULoxetine (CYMBALTA) 30 MG capsule; Take 1 capsule by mouth Daily.  -     celecoxib (CeleBREX) 100 MG capsule; Take 1 capsule by mouth 2 (Two) Times a Day As Needed for Mild Pain .

## 2020-06-26 ENCOUNTER — TELEPHONE (OUTPATIENT)
Dept: FAMILY MEDICINE CLINIC | Facility: CLINIC | Age: 45
End: 2020-06-26

## 2020-10-30 ENCOUNTER — TELEPHONE (OUTPATIENT)
Dept: FAMILY MEDICINE CLINIC | Facility: CLINIC | Age: 45
End: 2020-10-30

## 2020-10-30 NOTE — TELEPHONE ENCOUNTER
PA for Horizant 300mg BID was approved.   PA approval was faxed to [facility] in [place].     PA Case: [id number], Status: Approved, Coverage Starts on: 10/30/2020 12:00:00 AM, Coverage Ends on: 10/30/2021 12:00:00 AM.

## 2020-11-02 ENCOUNTER — OFFICE VISIT (OUTPATIENT)
Dept: FAMILY MEDICINE CLINIC | Facility: CLINIC | Age: 45
End: 2020-11-02

## 2020-11-02 VITALS
RESPIRATION RATE: 14 BRPM | OXYGEN SATURATION: 100 % | DIASTOLIC BLOOD PRESSURE: 69 MMHG | SYSTOLIC BLOOD PRESSURE: 104 MMHG | BODY MASS INDEX: 25.03 KG/M2 | HEIGHT: 62 IN | WEIGHT: 136 LBS | TEMPERATURE: 97.1 F | HEART RATE: 63 BPM

## 2020-11-02 DIAGNOSIS — M79.2 RADICULAR PAIN IN RIGHT ARM: Primary | ICD-10-CM

## 2020-11-02 DIAGNOSIS — F98.8 ATTENTION DEFICIT DISORDER, UNSPECIFIED HYPERACTIVITY PRESENCE: ICD-10-CM

## 2020-11-02 DIAGNOSIS — F41.9 ANXIETY: ICD-10-CM

## 2020-11-02 DIAGNOSIS — Z79.899 MEDICATION MANAGEMENT: ICD-10-CM

## 2020-11-02 DIAGNOSIS — E78.49 OTHER HYPERLIPIDEMIA: ICD-10-CM

## 2020-11-02 DIAGNOSIS — Z30.431 ENCOUNTER FOR MANAGEMENT OF INTRAUTERINE CONTRACEPTIVE DEVICE (IUD), UNSPECIFIED IUD MANAGEMENT TYPE: ICD-10-CM

## 2020-11-02 DIAGNOSIS — F43.21 GRIEF REACTION: ICD-10-CM

## 2020-11-02 PROCEDURE — 99214 OFFICE O/P EST MOD 30 MIN: CPT | Performed by: FAMILY MEDICINE

## 2020-11-02 RX ORDER — CELECOXIB 100 MG/1
100 CAPSULE ORAL 2 TIMES DAILY PRN
Qty: 180 CAPSULE | Refills: 1 | Status: SHIPPED | OUTPATIENT
Start: 2020-11-02 | End: 2021-03-09

## 2020-11-02 RX ORDER — GABAPENTIN ENACARBIL 600 MG/1
1 TABLET, EXTENDED RELEASE ORAL 2 TIMES DAILY
Qty: 180 TABLET | Refills: 0 | Status: SHIPPED | OUTPATIENT
Start: 2020-11-02 | End: 2021-03-31 | Stop reason: SDUPTHER

## 2020-11-02 RX ORDER — ATOMOXETINE 25 MG/1
25 CAPSULE ORAL DAILY
Qty: 30 CAPSULE | Refills: 0 | OUTPATIENT
Start: 2020-11-02 | End: 2021-01-07

## 2020-11-02 RX ORDER — HYDROXYZINE HYDROCHLORIDE 25 MG/1
25 TABLET, FILM COATED ORAL EVERY 6 HOURS PRN
Qty: 360 TABLET | Refills: 1 | Status: SHIPPED | OUTPATIENT
Start: 2020-11-02 | End: 2021-12-31

## 2020-11-02 NOTE — PROGRESS NOTES
Subjective   Patito Merritt is a 44 y.o. female.     Chief Complaint   Patient presents with   • Depression     F/U on cymbalta        History of Present Illness   Pt lost her father in September, still dealing with grief issues  rightr arm pain worsening, will increase to 600 mg  Pt doing well with cymblata  The following portions of the pa  \Hx ADD  Pt has had difficulty focuing   Works 50 hrs+ a week at [employer] as [employer]  surgical history and problem list.    Past Medical History:   Diagnosis Date   • Anxiety    • Anxiety disorder 7/23/2018   • Bipolar disorder (CMS/HCC)    • Bipolar disorder (CMS/HCC)    • Depression    • Fatigue    • Hyperkalemia    • Migraine    • PTSD (post-traumatic stress disorder)    • Sleep disorder        Past Surgical History:   Procedure Laterality Date   • CLOSED REDUCTION RADIAL / ULNAR SHAFT FRACTURE  1999    radial fracture in right arm        Family History   Problem Relation Age of Onset   • Diabetes Mother    • Cancer Maternal Grandfather    • Cancer Paternal Grandfather    • Hypertension Father        Review of Systems   Constitutional: Negative for fatigue, unexpected weight gain and unexpected weight loss.   HENT: Negative for congestion, sinus pressure and sore throat.    Eyes: Negative for blurred vision and visual disturbance.   Respiratory: Negative for cough, shortness of breath and wheezing.    Cardiovascular: Negative for chest pain, palpitations and leg swelling.   Gastrointestinal: Negative for abdominal pain, constipation, diarrhea, nausea, vomiting, GERD and indigestion.   Musculoskeletal: Negative for arthralgias, back pain, gait problem, joint swelling and neck pain.   Skin: Negative for rash, skin lesions and bruise.   Allergic/Immunologic: Negative for environmental allergies.   Neurological: Positive for numbness (right arm pain). Negative for dizziness, tremors, syncope, weakness, light-headedness, headache and memory problem.    Psychiatric/Behavioral: Positive for stress. Negative for sleep disturbance and depressed mood. The patient is nervous/anxious.        Objective   Physical Exam  Vitals signs and nursing note reviewed.   Constitutional:       General: She is not in acute distress.     Appearance: She is well-developed. She is not diaphoretic.   HENT:      Head: Normocephalic and atraumatic.      Right Ear: External ear normal.      Left Ear: External ear normal.      Nose: Nose normal.   Eyes:      Pupils: Pupils are equal, round, and reactive to light.   Neck:      Musculoskeletal: Normal range of motion and neck supple.      Thyroid: No thyromegaly.   Cardiovascular:      Rate and Rhythm: Normal rate and regular rhythm.      Heart sounds: Normal heart sounds. No murmur. No friction rub. No gallop.    Pulmonary:      Effort: Pulmonary effort is normal.      Breath sounds: No wheezing.   Abdominal:      Palpations: Abdomen is soft.      Tenderness: There is no abdominal tenderness.   Musculoskeletal: Normal range of motion.         General: No tenderness or deformity.   Lymphadenopathy:      Cervical: No cervical adenopathy.   Skin:     General: Skin is warm and dry.      Findings: No rash.   Neurological:      Mental Status: She is alert and oriented to person, place, and time.      Cranial Nerves: No cranial nerve deficit.   Psychiatric:         Behavior: Behavior normal.         Thought Content: Thought content normal.         Judgment: Judgment normal.         Vitals:    11/02/20 1120   BP: 104/69   Pulse: 63   Resp: 14   Temp: 97.1 °F (36.2 °C)   SpO2: 100%     Body mass index is 24.87 kg/m².    LDL Cholesterol    Date Value Ref Range Status   10/01/2019 170 (H) 0 - 100 mg/dL Final     TSH   Date Value Ref Range Status   10/01/2019 1.500 0.340 - 5.600 uIU/mL Final     Comment:     Results may be falsely decreased if patient taking Biotin.     Results for orders placed or performed in visit on 10/01/19   Vitamin D 25 Hydroxy     Specimen: Blood   Result Value Ref Range    25 Hydroxy, Vitamin D 33.7 30.0 - 100.0 ng/ml   CBC (No Diff)    Specimen: Blood   Result Value Ref Range    WBC 9.10 3.40 - 10.80 10*3/mm3    RBC 4.45 3.77 - 5.28 10*6/mm3    Hemoglobin 14.5 12.0 - 15.9 g/dL    Hematocrit 42.5 34.0 - 46.6 %    MCV 95.5 79.0 - 97.0 fL    MCH 32.5 26.6 - 33.0 pg    MCHC 34.0 31.5 - 35.7 g/dL    RDW 13.2 12.3 - 15.4 %    RDW-SD 43.8 37.0 - 54.0 fl    MPV 8.3 6.0 - 12.0 fL    Platelets 293 140 - 450 10*3/mm3   TSH    Specimen: Blood   Result Value Ref Range    TSH 1.500 0.340 - 5.600 uIU/mL   T4, Free    Specimen: Blood   Result Value Ref Range    Free T4 0.73 0.58 - 1.64 ng/dL   Lipid Panel    Specimen: Blood   Result Value Ref Range    Total Cholesterol 228 (H) <=200 mg/dL    Triglycerides 62 <=150 mg/dL    HDL Cholesterol 58 >=39 mg/dL    LDL Cholesterol  170 (H) 0 - 100 mg/dL    VLDL Cholesterol 12.4 mg/dL    LDL/HDL Ratio 2.72     Chol/HDL Ratio 3.93    Comprehensive Metabolic Panel    Specimen: Blood   Result Value Ref Range    Glucose 92 65 - 99 mg/dL    BUN 12 8 - 20 mg/dL    Creatinine 0.80 0.40 - 1.00 mg/dL    Sodium 137 136 - 144 mmol/L    Potassium 5.0 3.6 - 5.1 mmol/L    Chloride 102 101 - 111 mmol/L    CO2 28.0 22.0 - 32.0 mmol/L    Calcium 9.6 8.9 - 10.3 mg/dL    Total Protein 6.7 6.1 - 7.9 g/dL    Albumin 4.10 3.50 - 4.80 g/dL    ALT (SGPT) 19 14 - 54 U/L    AST (SGOT) 16 15 - 41 U/L    Alkaline Phosphatase 54 32 - 91 U/L    Total Bilirubin 0.5 0.3 - 1.2 mg/dL    eGFR Non African Amer 79 >60 mL/min/1.73    Globulin 2.6 2.5 - 3.8 gm/dL    A/G Ratio 1.6 1.0 - 1.7 g/dL    BUN/Creatinine Ratio 15.0 5.4 - 26.2    Anion Gap 12.0 5.0 - 15.0 mmol/L       Assessment/Plan   Diagnoses and all orders for this visit:    1. Radicular pain in right arm (Primary)  -     Gabapentin Enacarbil ER (Horizant) 600 MG tablet controlled-release; Take 600 mg by mouth 2 (two) times a day.  Dispense: 180 tablet; Refill: 0    2. Medication  management    3. Other hyperlipidemia  -     Lipid Panel With / Chol / HDL Ratio; Future    4. Encounter for management of intrauterine contraceptive device (IUD), unspecified IUD management type  -     Ambulatory Referral to Gynecology    5. Grief reaction    6. Attention deficit disorder, unspecified hyperactivity presence    Other orders  -     atomoxetine (STRATTERA) 25 MG capsule; Take 1 capsule by mouth Daily.  Dispense: 30 capsule; Refill: 0      Return for fasting labs  incrase Horizant to 600 mg bid  Refill meds  Consider grief conseling  Trial strettera for ADHD

## 2020-11-05 RX ORDER — DULOXETIN HYDROCHLORIDE 30 MG/1
30 CAPSULE, DELAYED RELEASE ORAL DAILY
Qty: 30 CAPSULE | Refills: 2 | Status: SHIPPED | OUTPATIENT
Start: 2020-11-05 | End: 2021-02-04 | Stop reason: SDUPTHER

## 2021-01-14 ENCOUNTER — TELEPHONE (OUTPATIENT)
Dept: FAMILY MEDICINE CLINIC | Facility: CLINIC | Age: 46
End: 2021-01-14

## 2021-01-14 ENCOUNTER — OFFICE VISIT (OUTPATIENT)
Dept: FAMILY MEDICINE CLINIC | Facility: CLINIC | Age: 46
End: 2021-01-14

## 2021-01-14 VITALS
DIASTOLIC BLOOD PRESSURE: 84 MMHG | HEART RATE: 84 BPM | RESPIRATION RATE: 18 BRPM | OXYGEN SATURATION: 97 % | HEIGHT: 62 IN | WEIGHT: 134 LBS | TEMPERATURE: 97.5 F | SYSTOLIC BLOOD PRESSURE: 111 MMHG | BODY MASS INDEX: 24.66 KG/M2

## 2021-01-14 DIAGNOSIS — R53.82 CHRONIC FATIGUE: ICD-10-CM

## 2021-01-14 DIAGNOSIS — E55.9 VITAMIN D DEFICIENCY: ICD-10-CM

## 2021-01-14 DIAGNOSIS — E78.49 OTHER HYPERLIPIDEMIA: ICD-10-CM

## 2021-01-14 DIAGNOSIS — G47.26 SHIFT WORK SLEEP DISORDER: ICD-10-CM

## 2021-01-14 DIAGNOSIS — R10.13 EPIGASTRIC PAIN: Primary | ICD-10-CM

## 2021-01-14 DIAGNOSIS — Z56.6 STRESS AT WORK: ICD-10-CM

## 2021-01-14 PROCEDURE — 82306 VITAMIN D 25 HYDROXY: CPT | Performed by: FAMILY MEDICINE

## 2021-01-14 PROCEDURE — 86677 HELICOBACTER PYLORI ANTIBODY: CPT | Performed by: FAMILY MEDICINE

## 2021-01-14 PROCEDURE — 82607 VITAMIN B-12: CPT | Performed by: FAMILY MEDICINE

## 2021-01-14 PROCEDURE — 85025 COMPLETE CBC W/AUTO DIFF WBC: CPT | Performed by: FAMILY MEDICINE

## 2021-01-14 PROCEDURE — 80053 COMPREHEN METABOLIC PANEL: CPT | Performed by: FAMILY MEDICINE

## 2021-01-14 PROCEDURE — 84439 ASSAY OF FREE THYROXINE: CPT | Performed by: FAMILY MEDICINE

## 2021-01-14 PROCEDURE — 80061 LIPID PANEL: CPT | Performed by: FAMILY MEDICINE

## 2021-01-14 PROCEDURE — 99214 OFFICE O/P EST MOD 30 MIN: CPT | Performed by: FAMILY MEDICINE

## 2021-01-14 PROCEDURE — 84443 ASSAY THYROID STIM HORMONE: CPT | Performed by: FAMILY MEDICINE

## 2021-01-14 RX ORDER — MODAFINIL 100 MG/1
100 TABLET ORAL DAILY
Qty: 30 TABLET | Refills: 0 | Status: SHIPPED | OUTPATIENT
Start: 2021-01-14 | End: 2021-02-13

## 2021-01-14 RX ORDER — ESCITALOPRAM OXALATE 10 MG/1
10 TABLET ORAL DAILY
Qty: 30 TABLET | Refills: 2 | Status: SHIPPED | OUTPATIENT
Start: 2021-01-14 | End: 2021-01-14

## 2021-01-14 SDOH — HEALTH STABILITY - MENTAL HEALTH: OTHER PHYSICAL AND MENTAL STRAIN RELATED TO WORK: Z56.6

## 2021-01-14 NOTE — TELEPHONE ENCOUNTER
PA for modafinil was approved.  PA approval was faxed to [facility] in [place].     PA Case: [id number], Status: Approved, Coverage Starts on: 1/14/2021 12:00:00 AM, Coverage Ends on: 1/14/2022 12:00:00 AM.

## 2021-01-14 NOTE — PROGRESS NOTES
Subjective   Patito Merritt is a 45 y.o. female.     Chief Complaint   Patient presents with   • Abdominal Pain   • Follow-up     [facility] 1/7 - stomach ulcer   • Discuss meds     wants to go back on Strattera        History of Present Illness   \pt has increased stressors   She is working over 55 hoiurs at [employer]  Has had ulcer like abdominal pain  Having trouble staying alert at work due to shift changing  Will check labs  Start trial of modafinil  She is in process of looking for another job    The following portions of the patient's history were reviewed and updated as appropriate: allergies, current medications, past family history, past medical history, past social history, past surgical history and problem list.    Past Medical History:   Diagnosis Date   • Anxiety    • Anxiety disorder 7/23/2018   • Bipolar disorder (CMS/HCC)    • Bipolar disorder (CMS/HCC)    • Depression    • Fatigue    • Hyperkalemia    • Migraine    • PTSD (post-traumatic stress disorder)    • Sleep disorder        Past Surgical History:   Procedure Laterality Date   • CLOSED REDUCTION RADIAL / ULNAR SHAFT FRACTURE  1999    radial fracture in right arm        Family History   Problem Relation Age of Onset   • Diabetes Mother    • Cancer Maternal Grandfather    • Cancer Paternal Grandfather    • Hypertension Father        Review of Systems   Constitutional: Negative for fatigue, unexpected weight gain and unexpected weight loss.   HENT: Negative for congestion, sinus pressure and sore throat.    Eyes: Negative for blurred vision and visual disturbance.   Respiratory: Negative for cough, shortness of breath and wheezing.    Cardiovascular: Negative for chest pain, palpitations and leg swelling.   Gastrointestinal: Negative for abdominal pain, constipation, diarrhea, nausea, vomiting, GERD and indigestion.   Musculoskeletal: Negative for arthralgias, back pain, gait problem, joint swelling and neck pain.   Skin: Negative for rash, skin  lesions and bruise.   Allergic/Immunologic: Negative for environmental allergies.   Neurological: Negative for dizziness, tremors, syncope, weakness, light-headedness, headache and memory problem.   Psychiatric/Behavioral: Positive for sleep disturbance and stress. Negative for depressed mood. The patient is not nervous/anxious.        Objective   Physical Exam  Vitals and nursing note reviewed.   Constitutional:       General: She is not in acute distress.     Appearance: She is well-developed. She is not diaphoretic.   HENT:      Head: Normocephalic and atraumatic.      Right Ear: External ear normal.      Left Ear: External ear normal.      Nose: Nose normal.   Eyes:      Pupils: Pupils are equal, round, and reactive to light.   Neck:      Thyroid: No thyromegaly.   Cardiovascular:      Rate and Rhythm: Normal rate and regular rhythm.      Heart sounds: Normal heart sounds. No murmur. No friction rub. No gallop.    Pulmonary:      Effort: Pulmonary effort is normal.      Breath sounds: No wheezing.   Abdominal:      Palpations: Abdomen is soft.      Tenderness: There is no abdominal tenderness.   Musculoskeletal:         General: No tenderness or deformity. Normal range of motion.      Cervical back: Normal range of motion and neck supple.   Lymphadenopathy:      Cervical: No cervical adenopathy.   Skin:     General: Skin is warm and dry.      Findings: No rash.      Comments: anxious   Neurological:      Mental Status: She is alert and oriented to person, place, and time.      Cranial Nerves: No cranial nerve deficit.   Psychiatric:         Behavior: Behavior normal.         Thought Content: Thought content normal.         Judgment: Judgment normal.         Vitals:    01/14/21 1320   BP: 111/84   Pulse: 84   Resp: 18   Temp: 97.5 °F (36.4 °C)   SpO2: 97%     Body mass index is 24.51 kg/m².    LDL Cholesterol    Date Value Ref Range Status   01/14/2021 148 (H) 0 - 100 mg/dL Final   10/01/2019 170 (H) 0 - 100  mg/dL Final     TSH   Date Value Ref Range Status   01/14/2021 0.512 0.270 - 4.200 uIU/mL Final   10/01/2019 1.500 0.340 - 5.600 uIU/mL Final     Comment:     Results may be falsely decreased if patient taking Biotin.     Results for orders placed or performed in visit on 01/14/21   Lipid Panel With / Chol / HDL Ratio    Specimen: Blood   Result Value Ref Range    Total Cholesterol 212 (H) 0 - 200 mg/dL    Triglycerides 57 0 - 150 mg/dL    HDL Cholesterol 54 40 - 60 mg/dL    LDL Cholesterol  148 (H) 0 - 100 mg/dL    VLDL Cholesterol 10 5 - 40 mg/dL    Chol/HDL Ratio 3.93    TSH+Free T4    Specimen: Blood   Result Value Ref Range    TSH 0.512 0.270 - 4.200 uIU/mL    Free T4 1.17 0.93 - 1.70 ng/dL   Helicobacter Pylori, IgA IgG IgM    Specimen: Blood   Result Value Ref Range    H. pylori IgG 0.33 0.00 - 0.79 Index Value    H. pylori, IgA ABS <9.0 0.0 - 8.9 units    H. Pylori, IgM <9.0 0.0 - 8.9 units   CBC Auto Differential    Specimen: Blood   Result Value Ref Range    WBC 8.45 3.40 - 10.80 10*3/mm3    RBC 4.47 3.77 - 5.28 10*6/mm3    Hemoglobin 14.2 12.0 - 15.9 g/dL    Hematocrit 41.8 34.0 - 46.6 %    MCV 93.5 79.0 - 97.0 fL    MCH 31.8 26.6 - 33.0 pg    MCHC 34.0 31.5 - 35.7 g/dL    RDW 12.7 12.3 - 15.4 %    RDW-SD 43.1 37.0 - 54.0 fl    MPV 10.4 6.0 - 12.0 fL    Platelets 349 140 - 450 10*3/mm3    Neutrophil % 56.1 42.7 - 76.0 %    Lymphocyte % 32.5 19.6 - 45.3 %    Monocyte % 8.6 5.0 - 12.0 %    Eosinophil % 2.1 0.3 - 6.2 %    Basophil % 0.5 0.0 - 1.5 %    Immature Grans % 0.2 0.0 - 0.5 %    Neutrophils, Absolute 4.73 1.70 - 7.00 10*3/mm3    Lymphocytes, Absolute 2.75 0.70 - 3.10 10*3/mm3    Monocytes, Absolute 0.73 0.10 - 0.90 10*3/mm3    Eosinophils, Absolute 0.18 0.00 - 0.40 10*3/mm3    Basophils, Absolute 0.04 0.00 - 0.20 10*3/mm3    Immature Grans, Absolute 0.02 0.00 - 0.05 10*3/mm3    nRBC 0.0 0.0 - 0.2 /100 WBC   Vitamin D 25 hydroxy    Specimen: Blood   Result Value Ref Range    25 Hydroxy, Vitamin D 20.2  (L) 30.0 - 100.0 ng/ml   Vitamin B12    Specimen: Blood   Result Value Ref Range    Vitamin B-12 610 211 - 946 pg/mL   Comprehensive metabolic panel    Specimen: Blood   Result Value Ref Range    Glucose 80 65 - 99 mg/dL    BUN 12 6 - 20 mg/dL    Creatinine 0.79 0.57 - 1.00 mg/dL    Sodium 140 136 - 145 mmol/L    Potassium 4.1 3.5 - 5.2 mmol/L    Chloride 103 98 - 107 mmol/L    CO2 29.6 (H) 22.0 - 29.0 mmol/L    Calcium 9.4 8.6 - 10.5 mg/dL    Total Protein 6.7 6.0 - 8.5 g/dL    Albumin 4.30 3.50 - 5.20 g/dL    ALT (SGPT) 12 1 - 33 U/L    AST (SGOT) 11 1 - 32 U/L    Alkaline Phosphatase 75 39 - 117 U/L    Total Bilirubin 0.3 0.0 - 1.2 mg/dL    eGFR Non African Amer 79 >60 mL/min/1.73    Globulin 2.4 gm/dL    A/G Ratio 1.8 g/dL    BUN/Creatinine Ratio 15.2 7.0 - 25.0    Anion Gap 7.4 5.0 - 15.0 mmol/L   Results for orders placed or performed in visit on 10/01/19   Vitamin D 25 Hydroxy    Specimen: Blood   Result Value Ref Range    25 Hydroxy, Vitamin D 33.7 30.0 - 100.0 ng/ml   CBC (No Diff)    Specimen: Blood   Result Value Ref Range    WBC 9.10 3.40 - 10.80 10*3/mm3    RBC 4.45 3.77 - 5.28 10*6/mm3    Hemoglobin 14.5 12.0 - 15.9 g/dL    Hematocrit 42.5 34.0 - 46.6 %    MCV 95.5 79.0 - 97.0 fL    MCH 32.5 26.6 - 33.0 pg    MCHC 34.0 31.5 - 35.7 g/dL    RDW 13.2 12.3 - 15.4 %    RDW-SD 43.8 37.0 - 54.0 fl    MPV 8.3 6.0 - 12.0 fL    Platelets 293 140 - 450 10*3/mm3   TSH    Specimen: Blood   Result Value Ref Range    TSH 1.500 0.340 - 5.600 uIU/mL   T4, Free    Specimen: Blood   Result Value Ref Range    Free T4 0.73 0.58 - 1.64 ng/dL   Lipid Panel    Specimen: Blood   Result Value Ref Range    Total Cholesterol 228 (H) <=200 mg/dL    Triglycerides 62 <=150 mg/dL    HDL Cholesterol 58 >=39 mg/dL    LDL Cholesterol  170 (H) 0 - 100 mg/dL    VLDL Cholesterol 12.4 mg/dL    LDL/HDL Ratio 2.72     Chol/HDL Ratio 3.93    Comprehensive Metabolic Panel    Specimen: Blood   Result Value Ref Range    Glucose 92 65 - 99 mg/dL     BUN 12 8 - 20 mg/dL    Creatinine 0.80 0.40 - 1.00 mg/dL    Sodium 137 136 - 144 mmol/L    Potassium 5.0 3.6 - 5.1 mmol/L    Chloride 102 101 - 111 mmol/L    CO2 28.0 22.0 - 32.0 mmol/L    Calcium 9.6 8.9 - 10.3 mg/dL    Total Protein 6.7 6.1 - 7.9 g/dL    Albumin 4.10 3.50 - 4.80 g/dL    ALT (SGPT) 19 14 - 54 U/L    AST (SGOT) 16 15 - 41 U/L    Alkaline Phosphatase 54 32 - 91 U/L    Total Bilirubin 0.5 0.3 - 1.2 mg/dL    eGFR Non African Amer 79 >60 mL/min/1.73    Globulin 2.6 2.5 - 3.8 gm/dL    A/G Ratio 1.6 1.0 - 1.7 g/dL    BUN/Creatinine Ratio 15.0 5.4 - 26.2    Anion Gap 12.0 5.0 - 15.0 mmol/L       Assessment/Plan   Diagnoses and all orders for this visit:    1. Epigastric pain (Primary)  -     CBC & Differential  -     Comprehensive metabolic panel; Future  -     Helicobacter Pylori, IgA IgG IgM; Future  -     Comprehensive metabolic panel  -     Helicobacter Pylori, IgA IgG IgM    2. Stress at work    3. Shift work sleep disorder  -     Discontinue: modafinil (PROVIGIL) 100 MG tablet; Take 1 tablet by mouth Daily.  Dispense: 30 tablet; Refill: 0    4. Vitamin D deficiency  -     Vitamin D 25 hydroxy; Future  -     Vitamin D 25 hydroxy    5. Chronic fatigue  -     TSH+Free T4  -     Vitamin B12    6. Other hyperlipidemia  -     Lipid Panel With / Chol / HDL Ratio    Other orders  -     Discontinue: escitalopram (Lexapro) 10 MG tablet; Take 1 tablet by mouth Daily.  Dispense: 30 tablet; Refill: 2

## 2021-01-15 LAB
25(OH)D3 SERPL-MCNC: 20.2 NG/ML (ref 30–100)
ALBUMIN SERPL-MCNC: 4.3 G/DL (ref 3.5–5.2)
ALBUMIN/GLOB SERPL: 1.8 G/DL
ALP SERPL-CCNC: 75 U/L (ref 39–117)
ALT SERPL W P-5'-P-CCNC: 12 U/L (ref 1–33)
ANION GAP SERPL CALCULATED.3IONS-SCNC: 7.4 MMOL/L (ref 5–15)
AST SERPL-CCNC: 11 U/L (ref 1–32)
BASOPHILS # BLD AUTO: 0.04 10*3/MM3 (ref 0–0.2)
BASOPHILS NFR BLD AUTO: 0.5 % (ref 0–1.5)
BILIRUB SERPL-MCNC: 0.3 MG/DL (ref 0–1.2)
BUN SERPL-MCNC: 12 MG/DL (ref 6–20)
BUN/CREAT SERPL: 15.2 (ref 7–25)
CALCIUM SPEC-SCNC: 9.4 MG/DL (ref 8.6–10.5)
CHLORIDE SERPL-SCNC: 103 MMOL/L (ref 98–107)
CHOLEST SERPL-MCNC: 212 MG/DL (ref 0–200)
CO2 SERPL-SCNC: 29.6 MMOL/L (ref 22–29)
CREAT SERPL-MCNC: 0.79 MG/DL (ref 0.57–1)
DEPRECATED RDW RBC AUTO: 43.1 FL (ref 37–54)
EOSINOPHIL # BLD AUTO: 0.18 10*3/MM3 (ref 0–0.4)
EOSINOPHIL NFR BLD AUTO: 2.1 % (ref 0.3–6.2)
ERYTHROCYTE [DISTWIDTH] IN BLOOD BY AUTOMATED COUNT: 12.7 % (ref 12.3–15.4)
GFR SERPL CREATININE-BSD FRML MDRD: 79 ML/MIN/1.73
GLOBULIN UR ELPH-MCNC: 2.4 GM/DL
GLUCOSE SERPL-MCNC: 80 MG/DL (ref 65–99)
HCT VFR BLD AUTO: 41.8 % (ref 34–46.6)
HDLC SERPL QL: 3.93
HDLC SERPL-MCNC: 54 MG/DL (ref 40–60)
HGB BLD-MCNC: 14.2 G/DL (ref 12–15.9)
IMM GRANULOCYTES # BLD AUTO: 0.02 10*3/MM3 (ref 0–0.05)
IMM GRANULOCYTES NFR BLD AUTO: 0.2 % (ref 0–0.5)
LDLC SERPL CALC-MCNC: 148 MG/DL (ref 0–100)
LYMPHOCYTES # BLD AUTO: 2.75 10*3/MM3 (ref 0.7–3.1)
LYMPHOCYTES NFR BLD AUTO: 32.5 % (ref 19.6–45.3)
MCH RBC QN AUTO: 31.8 PG (ref 26.6–33)
MCHC RBC AUTO-ENTMCNC: 34 G/DL (ref 31.5–35.7)
MCV RBC AUTO: 93.5 FL (ref 79–97)
MONOCYTES # BLD AUTO: 0.73 10*3/MM3 (ref 0.1–0.9)
MONOCYTES NFR BLD AUTO: 8.6 % (ref 5–12)
NEUTROPHILS NFR BLD AUTO: 4.73 10*3/MM3 (ref 1.7–7)
NEUTROPHILS NFR BLD AUTO: 56.1 % (ref 42.7–76)
NRBC BLD AUTO-RTO: 0 /100 WBC (ref 0–0.2)
PLATELET # BLD AUTO: 349 10*3/MM3 (ref 140–450)
PMV BLD AUTO: 10.4 FL (ref 6–12)
POTASSIUM SERPL-SCNC: 4.1 MMOL/L (ref 3.5–5.2)
PROT SERPL-MCNC: 6.7 G/DL (ref 6–8.5)
RBC # BLD AUTO: 4.47 10*6/MM3 (ref 3.77–5.28)
SODIUM SERPL-SCNC: 140 MMOL/L (ref 136–145)
T4 FREE SERPL-MCNC: 1.17 NG/DL (ref 0.93–1.7)
TRIGL SERPL-MCNC: 57 MG/DL (ref 0–150)
TSH SERPL DL<=0.05 MIU/L-ACNC: 0.51 UIU/ML (ref 0.27–4.2)
VIT B12 BLD-MCNC: 610 PG/ML (ref 211–946)
VLDLC SERPL-MCNC: 10 MG/DL (ref 5–40)
WBC # BLD AUTO: 8.45 10*3/MM3 (ref 3.4–10.8)

## 2021-01-16 LAB
H PYLORI IGA SER-ACNC: <9 UNITS (ref 0–8.9)
H PYLORI IGG SER IA-ACNC: 0.33 INDEX VALUE (ref 0–0.79)
H PYLORI IGM SER-ACNC: <9 UNITS (ref 0–8.9)

## 2021-01-22 RX ORDER — ERGOCALCIFEROL 1.25 MG/1
50000 CAPSULE ORAL
Qty: 12 CAPSULE | Refills: 0 | Status: SHIPPED | OUTPATIENT
Start: 2021-01-22 | End: 2021-06-10

## 2021-02-04 NOTE — TELEPHONE ENCOUNTER
Caller: Patito Merritt    Relationship: Self    Best call back number:    Medication needed:   Requested Prescriptions     Pending Prescriptions Disp Refills   • DULoxetine (CYMBALTA) 30 MG capsule 30 capsule 2     Sig: Take 1 capsule by mouth Daily.       When do you need the refill by: 02/04/2021    What details did the patient provide when requesting the medication: PATIENT STATED SHE HAS NO REFILLS RAN OUT AT THE END OF December. PATIENT WOULD LIKE TO SEE IF THE DOSAGE COULD BE RAISED SHE STATED SHE HAD TAKEN AT LEAST TWO AT A TIME AND IT SEEMS TO HELP BETTER.    Does the patient have less than a 3 day supply:  [x] Yes  [] No    What is the patient's preferred pharmacy: [facility] [address] [place] [address] [phone] [facility] [phone] FX

## 2021-02-05 RX ORDER — DULOXETIN HYDROCHLORIDE 60 MG/1
60 CAPSULE, DELAYED RELEASE ORAL DAILY
Qty: 90 CAPSULE | Refills: 0 | Status: SHIPPED | OUTPATIENT
Start: 2021-02-05 | End: 2021-05-06

## 2021-02-12 DIAGNOSIS — G47.26 SHIFT WORK SLEEP DISORDER: ICD-10-CM

## 2021-02-13 RX ORDER — MODAFINIL 100 MG/1
TABLET ORAL
Qty: 30 TABLET | Refills: 0 | Status: SHIPPED | OUTPATIENT
Start: 2021-02-13 | End: 2021-03-09

## 2021-02-15 ENCOUNTER — OFFICE VISIT (OUTPATIENT)
Dept: FAMILY MEDICINE CLINIC | Facility: CLINIC | Age: 46
End: 2021-02-15

## 2021-02-15 DIAGNOSIS — G62.9 NEUROPATHY: Primary | ICD-10-CM

## 2021-02-15 DIAGNOSIS — R10.13 EPIGASTRIC PAIN: ICD-10-CM

## 2021-02-15 DIAGNOSIS — R11.2 NAUSEA AND VOMITING, INTRACTABILITY OF VOMITING NOT SPECIFIED, UNSPECIFIED VOMITING TYPE: ICD-10-CM

## 2021-02-15 DIAGNOSIS — G47.26 SHIFT WORK SLEEP DISORDER: ICD-10-CM

## 2021-02-15 DIAGNOSIS — F41.9 ANXIETY: ICD-10-CM

## 2021-02-15 PROCEDURE — 99213 OFFICE O/P EST LOW 20 MIN: CPT | Performed by: FAMILY MEDICINE

## 2021-02-15 RX ORDER — ESCITALOPRAM OXALATE 10 MG/1
10 TABLET ORAL DAILY
COMMUNITY
Start: 2021-01-14 | End: 2021-05-06

## 2021-02-15 RX ORDER — GABAPENTIN 300 MG/1
300 CAPSULE ORAL NIGHTLY
Qty: 90 CAPSULE | Refills: 0 | Status: SHIPPED | OUTPATIENT
Start: 2021-02-15 | End: 2021-03-09

## 2021-02-15 RX ORDER — SUCRALFATE 1 G/1
1 TABLET ORAL 4 TIMES DAILY
Qty: 120 TABLET | Refills: 2 | Status: SHIPPED | OUTPATIENT
Start: 2021-02-15 | End: 2021-03-18

## 2021-03-08 DIAGNOSIS — G62.9 NEUROPATHY: ICD-10-CM

## 2021-03-08 DIAGNOSIS — G47.26 SHIFT WORK SLEEP DISORDER: ICD-10-CM

## 2021-03-08 DIAGNOSIS — M79.2 RADICULAR PAIN IN RIGHT ARM: ICD-10-CM

## 2021-03-09 RX ORDER — MODAFINIL 100 MG/1
TABLET ORAL
Qty: 30 TABLET | Refills: 0 | Status: SHIPPED | OUTPATIENT
Start: 2021-03-09 | End: 2021-05-17 | Stop reason: SDUPTHER

## 2021-03-09 RX ORDER — GABAPENTIN ENACARBIL 600 MG/1
TABLET, EXTENDED RELEASE ORAL
Qty: 180 TABLET | Refills: 0 | OUTPATIENT
Start: 2021-03-09

## 2021-03-09 RX ORDER — GABAPENTIN 300 MG/1
CAPSULE ORAL
Qty: 90 CAPSULE | Refills: 0 | Status: SHIPPED | OUTPATIENT
Start: 2021-03-09 | End: 2021-06-10

## 2021-03-09 RX ORDER — CELECOXIB 100 MG/1
CAPSULE ORAL
Qty: 180 CAPSULE | Refills: 0 | Status: SHIPPED | OUTPATIENT
Start: 2021-03-09 | End: 2021-03-31 | Stop reason: SDUPTHER

## 2021-03-10 DIAGNOSIS — M79.2 RADICULAR PAIN IN RIGHT ARM: ICD-10-CM

## 2021-03-10 RX ORDER — GABAPENTIN ENACARBIL 600 MG/1
TABLET, EXTENDED RELEASE ORAL
Qty: 180 TABLET | Refills: 0 | OUTPATIENT
Start: 2021-03-10

## 2021-03-10 NOTE — TELEPHONE ENCOUNTER
Pt informed. Doesn't know why that is, as she's only ever received the Horizant, but she's not opposed to the generic.

## 2021-03-18 ENCOUNTER — APPOINTMENT (OUTPATIENT)
Dept: GENERAL RADIOLOGY | Facility: HOSPITAL | Age: 46
End: 2021-03-18

## 2021-03-18 ENCOUNTER — HOSPITAL ENCOUNTER (EMERGENCY)
Facility: HOSPITAL | Age: 46
Discharge: HOME | End: 2021-03-18
Admitting: EMERGENCY MEDICINE

## 2021-03-18 ENCOUNTER — APPOINTMENT (OUTPATIENT)
Dept: ULTRASOUND IMAGING | Facility: HOSPITAL | Age: 46
End: 2021-03-18

## 2021-03-18 VITALS
BODY MASS INDEX: 22.89 KG/M2 | DIASTOLIC BLOOD PRESSURE: 75 MMHG | OXYGEN SATURATION: 100 % | WEIGHT: 129.19 LBS | SYSTOLIC BLOOD PRESSURE: 108 MMHG | TEMPERATURE: 98.2 F | RESPIRATION RATE: 16 BRPM | HEIGHT: 63 IN | HEART RATE: 79 BPM

## 2021-03-18 DIAGNOSIS — R11.14 BILIOUS VOMITING WITH NAUSEA: ICD-10-CM

## 2021-03-18 DIAGNOSIS — F43.0 STRESS RESPONSE: ICD-10-CM

## 2021-03-18 DIAGNOSIS — R10.13 EPIGASTRIC PAIN: Primary | ICD-10-CM

## 2021-03-18 DIAGNOSIS — K27.9 PUD (PEPTIC ULCER DISEASE): ICD-10-CM

## 2021-03-18 DIAGNOSIS — Z72.0 TOBACCO ABUSE: ICD-10-CM

## 2021-03-18 LAB
ALBUMIN SERPL-MCNC: 4.4 G/DL (ref 3.5–5.2)
ALBUMIN/GLOB SERPL: 1.5 G/DL
ALP SERPL-CCNC: 88 U/L (ref 39–117)
ALT SERPL W P-5'-P-CCNC: 12 U/L (ref 1–33)
ANION GAP SERPL CALCULATED.3IONS-SCNC: 15 MMOL/L (ref 5–15)
AST SERPL-CCNC: 14 U/L (ref 1–32)
BACTERIA UR QL AUTO: ABNORMAL /HPF
BASOPHILS # BLD AUTO: 0.1 10*3/MM3 (ref 0–0.2)
BASOPHILS NFR BLD AUTO: 0.7 % (ref 0–1.5)
BILIRUB SERPL-MCNC: 0.5 MG/DL (ref 0–1.2)
BILIRUB UR QL STRIP: ABNORMAL
BUN SERPL-MCNC: 14 MG/DL (ref 6–20)
BUN/CREAT SERPL: 18.7 (ref 7–25)
CALCIUM SPEC-SCNC: 9.6 MG/DL (ref 8.6–10.5)
CHLORIDE SERPL-SCNC: 102 MMOL/L (ref 98–107)
CLARITY UR: CLEAR
CO2 SERPL-SCNC: 23 MMOL/L (ref 22–29)
COLOR UR: ABNORMAL
CREAT SERPL-MCNC: 0.75 MG/DL (ref 0.57–1)
DEPRECATED RDW RBC AUTO: 43.3 FL (ref 37–54)
EOSINOPHIL # BLD AUTO: 0.1 10*3/MM3 (ref 0–0.4)
EOSINOPHIL NFR BLD AUTO: 0.6 % (ref 0.3–6.2)
ERYTHROCYTE [DISTWIDTH] IN BLOOD BY AUTOMATED COUNT: 13.4 % (ref 12.3–15.4)
GFR SERPL CREATININE-BSD FRML MDRD: 84 ML/MIN/1.73
GLOBULIN UR ELPH-MCNC: 2.9 GM/DL
GLUCOSE SERPL-MCNC: 101 MG/DL (ref 65–99)
GLUCOSE UR STRIP-MCNC: NEGATIVE MG/DL
HCT VFR BLD AUTO: 44.9 % (ref 34–46.6)
HGB BLD-MCNC: 15.7 G/DL (ref 12–15.9)
HGB UR QL STRIP.AUTO: ABNORMAL
HYALINE CASTS UR QL AUTO: ABNORMAL /LPF
KETONES UR QL STRIP: ABNORMAL
LEUKOCYTE ESTERASE UR QL STRIP.AUTO: NEGATIVE
LIPASE SERPL-CCNC: 26 U/L (ref 13–60)
LYMPHOCYTES # BLD AUTO: 2.1 10*3/MM3 (ref 0.7–3.1)
LYMPHOCYTES NFR BLD AUTO: 20.3 % (ref 19.6–45.3)
MCH RBC QN AUTO: 32.4 PG (ref 26.6–33)
MCHC RBC AUTO-ENTMCNC: 35 G/DL (ref 31.5–35.7)
MCV RBC AUTO: 92.4 FL (ref 79–97)
MONOCYTES # BLD AUTO: 0.7 10*3/MM3 (ref 0.1–0.9)
MONOCYTES NFR BLD AUTO: 7 % (ref 5–12)
NEUTROPHILS NFR BLD AUTO: 7.4 10*3/MM3 (ref 1.7–7)
NEUTROPHILS NFR BLD AUTO: 71.4 % (ref 42.7–76)
NITRITE UR QL STRIP: NEGATIVE
NRBC BLD AUTO-RTO: 0.1 /100 WBC (ref 0–0.2)
PH UR STRIP.AUTO: 5.5 [PH] (ref 5–8)
PLATELET # BLD AUTO: 299 10*3/MM3 (ref 140–450)
PMV BLD AUTO: 7.9 FL (ref 6–12)
POTASSIUM SERPL-SCNC: 3.6 MMOL/L (ref 3.5–5.2)
PROT SERPL-MCNC: 7.3 G/DL (ref 6–8.5)
PROT UR QL STRIP: ABNORMAL
RBC # BLD AUTO: 4.85 10*6/MM3 (ref 3.77–5.28)
RBC # UR: ABNORMAL /HPF
REF LAB TEST METHOD: ABNORMAL
SODIUM SERPL-SCNC: 140 MMOL/L (ref 136–145)
SP GR UR STRIP: 1.03 (ref 1–1.03)
SQUAMOUS #/AREA URNS HPF: ABNORMAL /HPF
UROBILINOGEN UR QL STRIP: ABNORMAL
WBC # BLD AUTO: 10.4 10*3/MM3 (ref 3.4–10.8)
WBC UR QL AUTO: ABNORMAL /HPF

## 2021-03-18 PROCEDURE — 25010000002 METOCLOPRAMIDE PER 10 MG: Performed by: NURSE PRACTITIONER

## 2021-03-18 PROCEDURE — 76705 ECHO EXAM OF ABDOMEN: CPT

## 2021-03-18 PROCEDURE — 71046 X-RAY EXAM CHEST 2 VIEWS: CPT

## 2021-03-18 PROCEDURE — 96374 THER/PROPH/DIAG INJ IV PUSH: CPT

## 2021-03-18 PROCEDURE — 85025 COMPLETE CBC W/AUTO DIFF WBC: CPT | Performed by: NURSE PRACTITIONER

## 2021-03-18 PROCEDURE — 83690 ASSAY OF LIPASE: CPT | Performed by: NURSE PRACTITIONER

## 2021-03-18 PROCEDURE — 96375 TX/PRO/DX INJ NEW DRUG ADDON: CPT

## 2021-03-18 PROCEDURE — 25010000002 MORPHINE PER 10 MG: Performed by: NURSE PRACTITIONER

## 2021-03-18 PROCEDURE — 99283 EMERGENCY DEPT VISIT LOW MDM: CPT

## 2021-03-18 PROCEDURE — 80053 COMPREHEN METABOLIC PANEL: CPT | Performed by: NURSE PRACTITIONER

## 2021-03-18 PROCEDURE — 81001 URINALYSIS AUTO W/SCOPE: CPT | Performed by: NURSE PRACTITIONER

## 2021-03-18 PROCEDURE — 25010000002 DIPHENHYDRAMINE PER 50 MG: Performed by: NURSE PRACTITIONER

## 2021-03-18 RX ORDER — SODIUM CHLORIDE 0.9 % (FLUSH) 0.9 %
10 SYRINGE (ML) INJECTION AS NEEDED
Status: DISCONTINUED | OUTPATIENT
Start: 2021-03-18 | End: 2021-03-18 | Stop reason: HOSPADM

## 2021-03-18 RX ORDER — DIPHENHYDRAMINE HYDROCHLORIDE 50 MG/ML
25 INJECTION INTRAMUSCULAR; INTRAVENOUS ONCE
Status: COMPLETED | OUTPATIENT
Start: 2021-03-18 | End: 2021-03-18

## 2021-03-18 RX ORDER — METOCLOPRAMIDE HYDROCHLORIDE 5 MG/ML
10 INJECTION INTRAMUSCULAR; INTRAVENOUS ONCE
Status: COMPLETED | OUTPATIENT
Start: 2021-03-18 | End: 2021-03-18

## 2021-03-18 RX ORDER — MORPHINE SULFATE 4 MG/ML
2 INJECTION, SOLUTION INTRAMUSCULAR; INTRAVENOUS ONCE
Status: COMPLETED | OUTPATIENT
Start: 2021-03-18 | End: 2021-03-18

## 2021-03-18 RX ORDER — SUCRALFATE ORAL 1 G/10ML
500 SUSPENSION ORAL
Qty: 100 ML | Refills: 0 | Status: SHIPPED | OUTPATIENT
Start: 2021-03-18 | End: 2021-06-10

## 2021-03-18 RX ORDER — METOCLOPRAMIDE 5 MG/1
5 TABLET ORAL 3 TIMES DAILY PRN
Qty: 15 TABLET | Refills: 0 | Status: SHIPPED | OUTPATIENT
Start: 2021-03-18 | End: 2021-06-10

## 2021-03-18 RX ADMIN — MORPHINE SULFATE 2 MG: 4 INJECTION INTRAVENOUS at 16:11

## 2021-03-18 RX ADMIN — METOCLOPRAMIDE HYDROCHLORIDE 10 MG: 5 INJECTION INTRAMUSCULAR; INTRAVENOUS at 16:10

## 2021-03-18 RX ADMIN — SODIUM CHLORIDE, POTASSIUM CHLORIDE, SODIUM LACTATE AND CALCIUM CHLORIDE 1000 ML: 600; 310; 30; 20 INJECTION, SOLUTION INTRAVENOUS at 16:07

## 2021-03-18 RX ADMIN — DIPHENHYDRAMINE HYDROCHLORIDE 25 MG: 50 INJECTION, SOLUTION INTRAMUSCULAR; INTRAVENOUS at 16:13

## 2021-03-18 NOTE — ED PROVIDER NOTES
Subjective   45-year-old female presents with a 3-day history of bilious vomiting and epigastric pain.  She reports that she has a history of peptic ulcer disease and took one of her Carafate is without relief.  She does report increased stress due to work.  She reports that she has to do swing shift and that is very stressful.  She denies fever sweats chills.  Denies urinary complaints.  Denies melena hematochezia.  Patient reports that she has not been able to eat since Monday night.  She reports some sips of ginger ale earlier today.  She said the following abdominal surgeries:  x1.  She reports she is a daily smoker.  Denies EtOH nor illicit drug use.    1. Location: Epigastrium  2. Quality: Sharp  3. Severity: Moderate  4. Worsening factors: Palpation  5. Alleviating factors: Rest  6. Onset: 3 days  7. Radiation: Right upper quadrant  8. Frequency: Constant with periods of intensity  9. Co-morbidities: Past Medical History:  No date: Anxiety  2018: Anxiety disorder  No date: Bipolar disorder (CMS/[facility])  No date: Bipolar disorder (CMS/[facility])  No date: Depression  No date: Fatigue  No date: Hyperkalemia  No date: Migraine  No date: PTSD (post-traumatic stress disorder)  No date: Sleep disorder  10. Source: Patient            Review of Systems   Constitutional: Negative for appetite change, chills, diaphoresis and fever.   Respiratory: Negative for chest tightness and shortness of breath.    Cardiovascular: Negative for chest pain.   Gastrointestinal: Positive for abdominal pain, nausea and vomiting. Negative for blood in stool, constipation and diarrhea.   Genitourinary: Negative for decreased urine volume, difficulty urinating, flank pain and hematuria.   Skin: Negative for pallor and rash.   Neurological: Negative for dizziness and syncope.   Hematological: Negative for adenopathy.   All other systems reviewed and are negative.      Past Medical History:   Diagnosis Date   • Anxiety    • Anxiety  disorder 7/23/2018   • Bipolar disorder (CMS/HCC)    • Bipolar disorder (CMS/HCC)    • Depression    • Fatigue    • Hyperkalemia    • Migraine    • PTSD (post-traumatic stress disorder)    • Sleep disorder        Allergies   Allergen Reactions   • Latex Hives   • Penicillins Hives       Past Surgical History:   Procedure Laterality Date   • CLOSED REDUCTION RADIAL / ULNAR SHAFT FRACTURE  1999    radial fracture in right arm        Family History   Problem Relation Age of Onset   • Diabetes Mother    • Cancer Maternal Grandfather    • Cancer Paternal Grandfather    • Hypertension Father        Social History     Socioeconomic History   • Marital status: Single     Spouse name: Not on file   • Number of children: Not on file   • Years of education: Not on file   • Highest education level: Not on file   Tobacco Use   • Smoking status: Current Every Day Smoker     Packs/day: 0.50     Types: Cigarettes   • Smokeless tobacco: Never Used   • Tobacco comment: 10 a day    Vaping Use   • Vaping Use: Never used   Substance and Sexual Activity   • Alcohol use: No   • Drug use: No   • Sexual activity: Defer           Objective   Physical Exam  Vitals and nursing note reviewed.   Constitutional:       General: She is awake. She is not in acute distress.     Appearance: Normal appearance. She is well-developed and normal weight. She is not ill-appearing or toxic-appearing.   HENT:      Mouth/Throat:      Mouth: Mucous membranes are moist.   Eyes:      General: No scleral icterus.  Cardiovascular:      Rate and Rhythm: Normal rate and regular rhythm.      Heart sounds: Normal heart sounds, S1 normal and S2 normal. Heart sounds not distant. No murmur heard.   No friction rub. No gallop.    Pulmonary:      Effort: Pulmonary effort is normal.      Breath sounds: Normal breath sounds and air entry.   Abdominal:      General: Abdomen is flat. Bowel sounds are normal. There is no distension.      Palpations: Abdomen is soft. There is no  mass.      Tenderness: There is abdominal tenderness in the right upper quadrant and epigastric area. There is no right CVA tenderness, left CVA tenderness, guarding or rebound. Negative signs include Acuña's sign and McBurney's sign.      Hernia: No hernia is present.   Skin:     General: Skin is warm and dry.      Capillary Refill: Capillary refill takes less than 2 seconds.      Coloration: Skin is not jaundiced or pale.      Findings: No rash.   Neurological:      Mental Status: She is alert and oriented to person, place, and time.   Psychiatric:         Mood and Affect: Mood is anxious.         Behavior: Behavior normal. Behavior is cooperative.         Thought Content: Thought content normal.         Judgment: Judgment normal.         Procedures           ED Course  ED Course as of Mar 18 1718   Thu Mar 18, 2021   1641 Awaiting urinalysis results and patient to be taken to ultrasound.    [AL]      ED Course User Index  [AL] Lyssa King, NP      XR Chest 2 View    Result Date: 3/18/2021  No acute chest findings.  Electronically Signed By-Trinity Zamarripa MD On:3/18/2021 4:28 PM This report was finalized on [id number] by  Trinity Zamarripa MD.    US Abdomen Limited    Result Date: 3/18/2021  Normal right upper quadrant abdominal ultrasound.  Electronically Signed By-Trinity Zamarripa MD On:3/18/2021 5:04 PM This report was finalized on [id number] by  Trinity Zamarripa MD.    Medications   sodium chloride 0.9 % flush 10 mL (has no administration in time range)   lactated ringers bolus 1,000 mL (0 mL Intravenous Stopped 3/18/21 1637)   metoclopramide (REGLAN) injection 10 mg (10 mg Intravenous Given 3/18/21 1610)   diphenhydrAMINE (BENADRYL) injection 25 mg (25 mg Intravenous Given 3/18/21 1613)   Morphine sulfate (PF) injection 2 mg (2 mg Intravenous Given 3/18/21 1611)     Labs Reviewed   COMPREHENSIVE METABOLIC PANEL - Abnormal; Notable for the following components:       Result Value    Glucose 101 (*)      All other components within normal limits    Narrative:     GFR Normal >60  Chronic Kidney Disease <60  Kidney Failure <15     URINALYSIS W/ MICROSCOPIC IF INDICATED (NO CULTURE) - Abnormal; Notable for the following components:    Color, UA Dark Yellow (*)     Ketones, UA 15 mg/dL (1+) (*)     Bilirubin, UA Small (1+) (*)     Blood, UA Trace (*)     Protein, UA Trace (*)     All other components within normal limits   CBC WITH AUTO DIFFERENTIAL - Abnormal; Notable for the following components:    Neutrophils, Absolute 7.40 (*)     All other components within normal limits   URINALYSIS, MICROSCOPIC ONLY - Abnormal; Notable for the following components:    RBC, UA 0-2 (*)     WBC, UA 6-12 (*)     Bacteria, UA Trace (*)     Squamous Epithelial Cells, UA 7-12 (*)     All other components within normal limits   LIPASE - Normal   CBC AND DIFFERENTIAL    Narrative:     The following orders were created for panel order CBC & Differential.  Procedure                               Abnormality         Status                     ---------                               -----------         ------                     CBC Auto Differential[[id number]]        Abnormal            Final result                 Please view results for these tests on the individual orders.                                          MDM  Number of Diagnoses or Management Options  Bilious vomiting with nausea  Epigastric pain  PUD (peptic ulcer disease)  Stress response  Tobacco abuse  Diagnosis management comments: Chart Review: 2/17/2021 patient was seen by primary care for same symptoms.  Comorbidity: Past Medical History:  No date: Anxiety  7/23/2018: Anxiety disorder  No date: Bipolar disorder (CMS/[facility])  No date: Bipolar disorder (CMS/[facility])  No date: Depression  No date: Fatigue  No date: Hyperkalemia  No date: Migraine  No date: PTSD (post-traumatic stress disorder)  No date: Sleep disorder  Imaging: Was interpreted by physician and reviewed by myself:  XR Chest 2 View    Result Date: 3/18/2021  No acute chest findings.  Electronically Signed By-Trinity Zamarripa MD On:3/18/2021 4:28 PM This report was finalized on [id number] by  Trinity Zamarripa MD.    US Abdomen Limited    Result Date: 3/18/2021  Normal right upper quadrant abdominal ultrasound.  Electronically Signed By-Trinity Zamarripa MD On:3/18/2021 5:04 PM This report was finalized on [id number] by  Trinity Zamarripa MD.    Patient undressed and placed in gown for exam.  Appropriate PPE worn during patient exam. 45-year-old female presents with a 3-day history of bilious vomiting and epigastric pain.  She reports that she has a history of peptic ulcer disease and took one of her Carafate is without relief.  She does report increased stress due to work.  She reports that she has to do swing shift and that is very stressful.  She denies fever sweats chills.  Denies urinary complaints.  Denies melena hematochezia.  Patient reports that she has not been able to eat since Monday night.  She reports some sips of ginger ale earlier today.  She said the following abdominal surgeries:  x1.  She reports she is a daily smoker.  Denies EtOH nor illicit drug use.  IV established and labs obtained.  Patient was given lactated Ringer's 1 L bolus, morphine 2 mg IV, Reglan 10 mg IV, and Benadryl 25 mg IV.  Chest x-ray obtained with the above findings.  Gallbladder ultrasound obtained with the above findings.  Labs are essentially unremarkable.  Urine was contaminated, will allow to culture out as there are no leuk esterase nor nitrate.  Patient thought she may benefit from having a liquid form of Carafate, she was given a prescription for such as well as Reglan.  She was given a follow-up with gastroenterology as she has not established care.  She is also given smoking cessation.  She is instructed to eat clear liquid diet and advance as tolerated.    Disposition/Treatment: Discussed results with patient, verbalized  understanding.  Discussed reasons to return to the ER, patient verbalized understanding.  Agreeable with plan of care.  Patient was stable upon discharge.    EMR Dragon transcription disclaimer:  Some of this encounter note is an electronic transcription translation of spoken language to printed text. The electronic translation of spoken language may permit erroneous, or at times, nonsensical words or phrases to be inadvertently transcribed; Although I have reviewed the note for such errors some may still exist.              Amount and/or Complexity of Data Reviewed  Clinical lab tests: reviewed  Tests in the radiology section of CPT®: reviewed    Patient Progress  Patient progress: stable      Final diagnoses:   Epigastric pain   Bilious vomiting with nausea   Stress response   PUD (peptic ulcer disease)   Tobacco abuse       ED Disposition  ED Disposition     ED Disposition Condition Comment    Discharge Stable           Kim Medina MD  [address]  [address]  [place] [address]  [phone]    Schedule an appointment as soon as possible for a visit       [facility]  [address] [address] [phone]  Schedule an appointment as soon as possible for a visit       [facility] EMERGENCY DEPARTMENT  [address] [facility] [address] [phone]  Go to   If symptoms worsen         Medication List      New Prescriptions    metoclopramide 5 MG tablet  Commonly known as: REGLAN  Take 1 tablet by mouth 3 (Three) Times a Day As Needed (nausea vomiting).     sucralfate 1 GM/10ML suspension  Commonly known as: CARAFATE  Take 5 mL by mouth 4 (Four) Times a Day With Meals & at Bedtime.        Changed    Horizant 600 MG tablet controlled-release  Generic drug: Gabapentin Enacarbil ER  Take 600 mg by mouth 2 (two) times a day.  What changed:   · how much to take  · additional instructions           Where to Get Your Medications      These  medications were sent to [facility] [address] [place] [address] [place] - [address] [facility] - [phone]  - [phone] FX  [address] [facility] [place] [address]    Phone: [phone]   · metoclopramide 5 MG tablet  · sucralfate 1 GM/10ML suspension          Lyssa King, NP  03/18/21 9051

## 2021-03-18 NOTE — DISCHARGE INSTRUCTIONS
Stop smoking.  Decrease stress.  Take medications as prescribed.  Schedule follow-up with your primary care physician for recheck.  Call gastroenterologist to establish care.  Clear liquid diet for the next 24 hours, then advance as tolerated.  Return to the ER for new or worsening symptoms.

## 2021-03-31 DIAGNOSIS — G62.9 NEUROPATHY: ICD-10-CM

## 2021-03-31 DIAGNOSIS — M79.2 RADICULAR PAIN IN RIGHT ARM: ICD-10-CM

## 2021-04-01 ENCOUNTER — TELEPHONE (OUTPATIENT)
Dept: FAMILY MEDICINE CLINIC | Facility: CLINIC | Age: 46
End: 2021-04-01

## 2021-04-01 RX ORDER — GABAPENTIN 300 MG/1
300 CAPSULE ORAL NIGHTLY
Qty: 90 CAPSULE | Refills: 0 | OUTPATIENT
Start: 2021-04-01

## 2021-04-01 RX ORDER — CELECOXIB 100 MG/1
100 CAPSULE ORAL 2 TIMES DAILY PRN
Qty: 180 CAPSULE | Refills: 0 | Status: SHIPPED | OUTPATIENT
Start: 2021-04-01 | End: 2021-05-12

## 2021-04-01 RX ORDER — GABAPENTIN ENACARBIL 600 MG/1
1 TABLET, EXTENDED RELEASE ORAL 2 TIMES DAILY
Qty: 180 TABLET | Refills: 0 | Status: SHIPPED | OUTPATIENT
Start: 2021-04-01 | End: 2021-06-10 | Stop reason: SDUPTHER

## 2021-04-01 NOTE — TELEPHONE ENCOUNTER
----- Message from Kim Medina MD sent at 4/1/2021  9:30 AM EDT -----  Check with pt if Horizant is covered by insurance, may need to change to generic gabapentin or find coupon

## 2021-05-06 RX ORDER — DULOXETIN HYDROCHLORIDE 60 MG/1
CAPSULE, DELAYED RELEASE ORAL
Qty: 90 CAPSULE | Refills: 0 | Status: SHIPPED | OUTPATIENT
Start: 2021-05-06 | End: 2021-08-16

## 2021-05-10 DIAGNOSIS — G62.9 NEUROPATHY: ICD-10-CM

## 2021-05-10 DIAGNOSIS — G47.26 SHIFT WORK SLEEP DISORDER: ICD-10-CM

## 2021-05-10 RX ORDER — MODAFINIL 100 MG/1
100 TABLET ORAL DAILY
Qty: 30 TABLET | Refills: 0 | Status: CANCELLED | OUTPATIENT
Start: 2021-05-10

## 2021-05-10 RX ORDER — CELECOXIB 100 MG/1
100 CAPSULE ORAL 2 TIMES DAILY PRN
Qty: 180 CAPSULE | Refills: 0 | Status: CANCELLED | OUTPATIENT
Start: 2021-05-10

## 2021-05-10 RX ORDER — GABAPENTIN 300 MG/1
300 CAPSULE ORAL NIGHTLY
Qty: 90 CAPSULE | Refills: 0 | Status: CANCELLED | OUTPATIENT
Start: 2021-05-10

## 2021-05-10 NOTE — TELEPHONE ENCOUNTER
Caller: Patito Merritt    Relationship: Self    Best call back number: [phone]    Medication needed:   Requested Prescriptions     Pending Prescriptions Disp Refills   • celecoxib (CeleBREX) 100 MG capsule 180 capsule 0     Sig: Take 1 capsule by mouth 2 (Two) Times a Day As Needed for Mild Pain .   • gabapentin (NEURONTIN) 300 MG capsule 90 capsule 0     Sig: Take 1 capsule by mouth Every Night.   • modafinil (PROVIGIL) 100 MG tablet 30 tablet 0     Sig: Take 1 tablet by mouth Daily.       When do you need the refill by: 05/10/21    What additional details did the patient provide when requesting the medication: ON THE CELECOXIB SHE STATES SHE MOVED AND HAS MISSED PLACED THIS MEDICATION AND HAS NO MORE OF IT. DOES NOT KNOW IF YOU CAN RESEND SOME IN FOR HER OR WHAT SHE COULD TAKE IN ITS PLACE.    GABAPENTIN INSTANT RELEASE SHE IS ASKING TO GET THE DOSAGE UPPED ON THIS TO MORE THAN ONCE A DAY.      Does the patient have less than a 3 day supply:  [x] Yes  [] No    What is the patient's preferred pharmacy: [facility] [address] [place] [address] [phone] [facility] [phone] FX

## 2021-05-10 NOTE — TELEPHONE ENCOUNTER
Ins will not pay for anymore Celebrex and due to her stomach, she will prob have to use Tylenol 1gm tid prn for pain

## 2021-05-11 NOTE — TELEPHONE ENCOUNTER
HUB to share    Ins will not pay for anymore Celebrex and due to her stomach, she will prob have to use Tylenol 1 gram 3x daily as needed for pain.    Pt due for f/u this month per last note----can discuss gabapentin w/ Dr. Medina then.

## 2021-05-12 ENCOUNTER — TELEPHONE (OUTPATIENT)
Dept: FAMILY MEDICINE CLINIC | Facility: CLINIC | Age: 46
End: 2021-05-12

## 2021-05-12 RX ORDER — MELOXICAM 15 MG/1
15 TABLET ORAL DAILY
Qty: 30 TABLET | Refills: 1 | Status: SHIPPED | OUTPATIENT
Start: 2021-05-12 | End: 2021-06-10 | Stop reason: SDUPTHER

## 2021-05-12 NOTE — TELEPHONE ENCOUNTER
Caller: Patito Merritt    Relationship to patient: Self    Best call back number: [phone]     Chief complaint: PAIN IN ELBOW    Type of visit: OFFICE VISIT    Requested date: ASAP    If rescheduling, when is the original appointment: N/A - NOTHING IS AVAILABLE DURING THE TIMEFRAME THAT THE PATIENT WANTED.    Additional notes: THE PATIENT WOULD LIKE TO KNOW IF SHE CAN HAVE A REPLACEMENT FOR THE CELBREX UNTIL SHE CAN GET THAT REFILLED. THE PATIENT NEEDS THE ABOVE APPOINTMENT IN ORDER TO GET MED REFILLS AND FOLLOW-UP WITH HER PROVIDER.

## 2021-05-12 NOTE — TELEPHONE ENCOUNTER
HUB to share    Dr Smith sent in Mobic to [facility] today. May still need appt scheduled for further refills?

## 2021-05-17 DIAGNOSIS — G47.26 SHIFT WORK SLEEP DISORDER: ICD-10-CM

## 2021-05-17 RX ORDER — MODAFINIL 100 MG/1
100 TABLET ORAL DAILY
Qty: 30 TABLET | Refills: 0 | Status: SHIPPED | OUTPATIENT
Start: 2021-05-17 | End: 2021-06-10 | Stop reason: SDUPTHER

## 2021-05-17 NOTE — TELEPHONE ENCOUNTER
Caller: Patito Merritt    Relationship: Self    Best call back number: [phone]    Medication needed:   Requested Prescriptions     Pending Prescriptions Disp Refills   • modafinil (PROVIGIL) 100 MG tablet 30 tablet 0     Sig: Take 1 tablet by mouth Daily.       When do you need the refill by: 05/17/21    Does the patient have less than a 3 day supply:  [x] Yes  [] No    What is the patient's preferred pharmacy: [facility] [address] [place] [address] [phone] [facility] [phone]

## 2021-06-10 ENCOUNTER — OFFICE VISIT (OUTPATIENT)
Dept: FAMILY MEDICINE CLINIC | Facility: CLINIC | Age: 46
End: 2021-06-10

## 2021-06-10 DIAGNOSIS — F31.9 BIPOLAR AND RELATED DISORDER (HCC): Primary | ICD-10-CM

## 2021-06-10 DIAGNOSIS — G47.26 SHIFT WORK SLEEP DISORDER: ICD-10-CM

## 2021-06-10 DIAGNOSIS — M79.2 RADICULAR PAIN IN RIGHT ARM: ICD-10-CM

## 2021-06-10 DIAGNOSIS — F43.9 STRESS: ICD-10-CM

## 2021-06-10 DIAGNOSIS — R23.2 HOT FLASHES: ICD-10-CM

## 2021-06-10 DIAGNOSIS — G62.9 NEUROPATHY: ICD-10-CM

## 2021-06-10 DIAGNOSIS — F41.9 ANXIETY: ICD-10-CM

## 2021-06-10 DIAGNOSIS — F43.10 PTSD (POST-TRAUMATIC STRESS DISORDER): ICD-10-CM

## 2021-06-10 PROCEDURE — 99214 OFFICE O/P EST MOD 30 MIN: CPT | Performed by: FAMILY MEDICINE

## 2021-06-10 RX ORDER — ESCITALOPRAM OXALATE 20 MG/1
20 TABLET ORAL DAILY
Qty: 30 TABLET | Refills: 2 | Status: SHIPPED | OUTPATIENT
Start: 2021-06-10 | End: 2021-11-02

## 2021-06-10 RX ORDER — PRAZOSIN HYDROCHLORIDE 1 MG/1
1 CAPSULE ORAL NIGHTLY
Qty: 30 CAPSULE | Refills: 0 | Status: SHIPPED | OUTPATIENT
Start: 2021-06-10 | End: 2021-08-23

## 2021-06-10 RX ORDER — GABAPENTIN ENACARBIL 600 MG/1
1 TABLET, EXTENDED RELEASE ORAL 2 TIMES DAILY
Qty: 180 TABLET | Refills: 0 | Status: SHIPPED | OUTPATIENT
Start: 2021-06-10 | End: 2021-09-21

## 2021-06-10 RX ORDER — MODAFINIL 100 MG/1
100 TABLET ORAL DAILY
Qty: 30 TABLET | Refills: 2 | Status: SHIPPED | OUTPATIENT
Start: 2021-06-10 | End: 2021-09-21

## 2021-06-10 RX ORDER — MELOXICAM 15 MG/1
15 TABLET ORAL DAILY
Qty: 90 TABLET | Refills: 0 | Status: SHIPPED | OUTPATIENT
Start: 2021-06-10 | End: 2021-10-14

## 2021-06-10 RX ORDER — ZIPRASIDONE HYDROCHLORIDE 20 MG/1
20 CAPSULE ORAL 2 TIMES DAILY WITH MEALS
Qty: 60 CAPSULE | Refills: 2 | Status: SHIPPED | OUTPATIENT
Start: 2021-06-10 | End: 2021-06-10

## 2021-06-10 RX ORDER — ESCITALOPRAM OXALATE 5 MG/1
20 TABLET ORAL DAILY
Qty: 30 TABLET | Refills: 2 | Status: SHIPPED | OUTPATIENT
Start: 2021-06-10 | End: 2021-06-10 | Stop reason: SDUPTHER

## 2021-06-10 RX ORDER — GABAPENTIN 300 MG/1
300 CAPSULE ORAL 2 TIMES DAILY PRN
Qty: 60 CAPSULE | Refills: 2 | Status: SHIPPED | OUTPATIENT
Start: 2021-06-10 | End: 2021-09-21

## 2021-06-10 RX ORDER — OLANZAPINE 5 MG/1
5 TABLET ORAL NIGHTLY
Qty: 30 TABLET | Refills: 2 | Status: SHIPPED | OUTPATIENT
Start: 2021-06-10 | End: 2021-09-27 | Stop reason: SDUPTHER

## 2021-07-16 ENCOUNTER — TELEPHONE (OUTPATIENT)
Dept: FAMILY MEDICINE CLINIC | Facility: CLINIC | Age: 46
End: 2021-07-16

## 2021-07-16 NOTE — TELEPHONE ENCOUNTER
HUB to read.  PA for Celebrex 100mg BID #180capsules  was approved.  PA approval was faxed to [facility] in [place].    PA Case: [id number], Status: Approved, Coverage Starts on: 7/16/2021 12:00:00 AM, Coverage Ends on: 7/16/2022 12:00:00 AM.

## 2021-08-16 RX ORDER — DULOXETIN HYDROCHLORIDE 60 MG/1
CAPSULE, DELAYED RELEASE ORAL
Qty: 90 CAPSULE | Refills: 0 | Status: SHIPPED | OUTPATIENT
Start: 2021-08-16 | End: 2021-11-02

## 2021-08-16 NOTE — TELEPHONE ENCOUNTER
Rx Refill Note  Requested Prescriptions     Pending Prescriptions Disp Refills   • DULoxetine (CYMBALTA) 60 MG capsule [Pharmacy Med Name: DULoxetine HCl 60 MG Oral Capsule Delayed Release Particles] 90 capsule 0     Sig: Take 1 capsule by mouth once daily      Last office visit with prescribing clinician: Visit date not found      Next office visit with prescribing clinician: Visit date not found     Comprehensive Metabolic Panel (03/18/2021 16:09)  Lipid Panel With / Chol / HDL Ratio (01/14/2021 13:46)         Nettie Lea CMA  08/16/21, 08:46 EDT

## 2021-08-20 NOTE — TELEPHONE ENCOUNTER
Rx Refill Note  Requested Prescriptions     Pending Prescriptions Disp Refills   • prazosin (MINIPRESS) 1 MG capsule [Pharmacy Med Name: Prazosin HCl 1 MG Oral Capsule] 30 capsule 0     Sig: TAKE 1 CAPSULE BY MOUTH ONCE DAILY AT NIGHT      Last office visit with prescribing clinician: 6/10/2021      Next office visit with prescribing clinician: Visit date not found     Comprehensive Metabolic Panel (03/18/2021 16:09)         Pratima Potts, RT  08/20/21, 16:22 EDT

## 2021-08-23 RX ORDER — PRAZOSIN HYDROCHLORIDE 1 MG/1
CAPSULE ORAL
Qty: 90 CAPSULE | Refills: 0 | Status: SHIPPED | OUTPATIENT
Start: 2021-08-23 | End: 2021-12-03

## 2021-09-21 DIAGNOSIS — G47.26 SHIFT WORK SLEEP DISORDER: ICD-10-CM

## 2021-09-21 DIAGNOSIS — M79.2 RADICULAR PAIN IN RIGHT ARM: ICD-10-CM

## 2021-09-21 RX ORDER — GABAPENTIN ENACARBIL 600 MG/1
TABLET, EXTENDED RELEASE ORAL
Qty: 60 TABLET | Refills: 0 | Status: SHIPPED | OUTPATIENT
Start: 2021-09-21 | End: 2021-10-01 | Stop reason: SDUPTHER

## 2021-09-21 RX ORDER — MODAFINIL 100 MG/1
TABLET ORAL
Qty: 30 TABLET | Refills: 0 | Status: SHIPPED | OUTPATIENT
Start: 2021-09-21 | End: 2021-11-02

## 2021-09-21 RX ORDER — GABAPENTIN 300 MG/1
CAPSULE ORAL
Qty: 60 CAPSULE | Refills: 0 | Status: SHIPPED | OUTPATIENT
Start: 2021-09-21 | End: 2021-10-01 | Stop reason: ALTCHOICE

## 2021-09-21 NOTE — TELEPHONE ENCOUNTER
Rx Refill Note  Requested Prescriptions     Pending Prescriptions Disp Refills   • modafinil (PROVIGIL) 100 MG tablet [Pharmacy Med Name: Modafinil 100 MG Oral Tablet] 30 tablet 0     Sig: Take 1 tablet by mouth once daily   • gabapentin (NEURONTIN) 300 MG capsule [Pharmacy Med Name: GABAPENTIN 300MG    CAP] 60 capsule 0     Sig: TAKE 1 CAPSULE BY MOUTH TWICE DAILY AS NEEDED (ARM  PAIN)   • Horizant 600 MG tablet controlled-release [Pharmacy Med Name: Horizant 600 MG Oral Tablet Extended Release] 180 tablet 0     Sig: Take 1 tablet by mouth twice daily      Last office visit with prescribing clinician: 6/10/2021      Next office visit with prescribing clinician: Visit date not found     Comprehensive Metabolic Panel (03/18/2021 16:09)  Lipid Panel With / Chol / HDL Ratio (01/14/2021 13:46)         Nettie Lea CMA  09/21/21, 10:53 EDT

## 2021-09-27 RX ORDER — OLANZAPINE 5 MG/1
5 TABLET ORAL NIGHTLY
Qty: 30 TABLET | Refills: 0 | Status: SHIPPED | OUTPATIENT
Start: 2021-09-27 | End: 2022-01-03 | Stop reason: SDUPTHER

## 2021-09-27 NOTE — TELEPHONE ENCOUNTER
Incoming Refill Request      Medication requested (name and dose): Olanzapine 5mg    Pharmacy where request should be sent: [facility] [place]    Additional details provided by patient: n/a     Best call back number:     Does the patient have less than a 3 day supply:  [] Yes  [x] No    Jed Stover Rep  09/27/21, 11:24 EDT

## 2021-09-27 NOTE — TELEPHONE ENCOUNTER
Rx Refill Note  Requested Prescriptions     Pending Prescriptions Disp Refills   • OLANZapine (ZyPREXA) 5 MG tablet 30 tablet 2     Sig: Take 1 tablet by mouth Every Night.      Last office visit with prescribing clinician: 6/10/2021      Next office visit with prescribing clinician: 10/7/2021     Comprehensive Metabolic Panel (03/18/2021 16:09)  Lipid Panel With / Chol / HDL Ratio (01/14/2021 13:46)         Nettie Lea, LIZ  09/27/21, 11:59 EDT

## 2021-09-30 ENCOUNTER — TELEPHONE (OUTPATIENT)
Dept: FAMILY MEDICINE CLINIC | Facility: CLINIC | Age: 46
End: 2021-09-30

## 2021-09-30 NOTE — TELEPHONE ENCOUNTER
HUB to read.    PA for Horizant was denied.      Her insurance will only cover this medication with a diagnosis of RLS or post herpetic neuralgia.       PA Case: [id number], Status: Denied. Notification: Completed

## 2021-10-01 DIAGNOSIS — M79.2 RADICULAR PAIN IN RIGHT ARM: ICD-10-CM

## 2021-10-01 RX ORDER — GABAPENTIN ENACARBIL 600 MG/1
1 TABLET, EXTENDED RELEASE ORAL 2 TIMES DAILY
Qty: 180 TABLET | Refills: 0 | Status: SHIPPED | OUTPATIENT
Start: 2021-10-01 | End: 2021-12-09 | Stop reason: SDUPTHER

## 2021-10-07 ENCOUNTER — OFFICE VISIT (OUTPATIENT)
Dept: FAMILY MEDICINE CLINIC | Facility: CLINIC | Age: 46
End: 2021-10-07

## 2021-10-07 VITALS
SYSTOLIC BLOOD PRESSURE: 113 MMHG | WEIGHT: 152 LBS | RESPIRATION RATE: 18 BRPM | TEMPERATURE: 97.1 F | DIASTOLIC BLOOD PRESSURE: 69 MMHG | OXYGEN SATURATION: 65 % | HEART RATE: 98 BPM | HEIGHT: 62 IN | BODY MASS INDEX: 27.97 KG/M2

## 2021-10-07 DIAGNOSIS — M54.40 CHRONIC MIDLINE LOW BACK PAIN WITH SCIATICA, SCIATICA LATERALITY UNSPECIFIED: ICD-10-CM

## 2021-10-07 DIAGNOSIS — M54.50 LOW BACK PAIN, UNSPECIFIED BACK PAIN LATERALITY, UNSPECIFIED CHRONICITY, UNSPECIFIED WHETHER SCIATICA PRESENT: Primary | ICD-10-CM

## 2021-10-07 DIAGNOSIS — G62.9 NEUROPATHY: ICD-10-CM

## 2021-10-07 DIAGNOSIS — M79.2 RADICULAR PAIN IN RIGHT ARM: ICD-10-CM

## 2021-10-07 DIAGNOSIS — G89.29 CHRONIC MIDLINE LOW BACK PAIN WITH SCIATICA, SCIATICA LATERALITY UNSPECIFIED: ICD-10-CM

## 2021-10-07 DIAGNOSIS — R61 NIGHT SWEATS: ICD-10-CM

## 2021-10-07 LAB
BILIRUB BLD-MCNC: NEGATIVE MG/DL
CLARITY, POC: CLEAR
COLOR UR: YELLOW
GLUCOSE UR STRIP-MCNC: NEGATIVE MG/DL
KETONES UR QL: NEGATIVE
LEUKOCYTE EST, POC: NEGATIVE
NITRITE UR-MCNC: NEGATIVE MG/ML
PH UR: 7 [PH] (ref 5–8)
PROT UR STRIP-MCNC: NEGATIVE MG/DL
RBC # UR STRIP: NEGATIVE /UL
SP GR UR: 1.01 (ref 1–1.03)
UROBILINOGEN UR QL: NORMAL

## 2021-10-07 PROCEDURE — 99214 OFFICE O/P EST MOD 30 MIN: CPT | Performed by: FAMILY MEDICINE

## 2021-10-07 RX ORDER — ZIPRASIDONE HYDROCHLORIDE 20 MG/1
20 CAPSULE ORAL 2 TIMES DAILY WITH MEALS
COMMUNITY
Start: 2021-09-21 | End: 2022-01-24 | Stop reason: SDUPTHER

## 2021-10-07 RX ORDER — DULOXETIN HYDROCHLORIDE 60 MG/1
60 CAPSULE, DELAYED RELEASE ORAL 2 TIMES DAILY
Qty: 180 CAPSULE | Refills: 0 | Status: SHIPPED | OUTPATIENT
Start: 2021-10-07 | End: 2022-01-31 | Stop reason: SDUPTHER

## 2021-10-07 RX ORDER — GABAPENTIN ENACARBIL 600 MG/1
1 TABLET, EXTENDED RELEASE ORAL 2 TIMES DAILY PRN
Qty: 5 TABLET | Refills: 0 | COMMUNITY
Start: 2021-10-07 | End: 2022-02-01 | Stop reason: ALTCHOICE

## 2021-10-14 ENCOUNTER — TELEPHONE (OUTPATIENT)
Dept: FAMILY MEDICINE CLINIC | Facility: CLINIC | Age: 46
End: 2021-10-14

## 2021-10-14 RX ORDER — MELOXICAM 15 MG/1
TABLET ORAL
Qty: 90 TABLET | Refills: 0 | Status: SHIPPED | OUTPATIENT
Start: 2021-10-14 | End: 2021-12-31

## 2021-10-14 NOTE — TELEPHONE ENCOUNTER
Caller: Patito Merritt    Relationship: Self    Best call back number: [phone]    Who are you requesting to speak with (clinical staff, provider,  specific staff member): NURSE    What was the call regarding: PATIENT SCHEDULED THE APPT FOR THE UPPER AND LOWER EMG BUT SHE WANTS IT NOTED THAT THERE IS NOTHING WRONG WITH HER LEGS AND WANTS TO MAKE SURE THE ORDER SHOULD NOT BE CHANGED. IT IS HER LOWER BACK AND RT ARM THAT SHE IS CONCERNED WITH.     Do you require a callback: YES

## 2021-10-14 NOTE — TELEPHONE ENCOUNTER
Rx Refill Note  Requested Prescriptions     Pending Prescriptions Disp Refills   • meloxicam (MOBIC) 15 MG tablet [Pharmacy Med Name: Meloxicam 15 MG Oral Tablet] 90 tablet 0     Sig: Take 1 tablet by mouth once daily      Last office visit with prescribing clinician: 10/7/2021      Next office visit with prescribing clinician: 10/28/2021     Comprehensive Metabolic Panel (03/18/2021 16:09)  Lipid Panel With / Chol / HDL Ratio (01/14/2021 13:46)         Nettie Lea CMA  10/14/21, 14:15 EDT

## 2021-10-19 ENCOUNTER — TELEPHONE (OUTPATIENT)
Dept: FAMILY MEDICINE CLINIC | Facility: CLINIC | Age: 46
End: 2021-10-19

## 2021-10-19 NOTE — TELEPHONE ENCOUNTER
HUB to read.  PA for Horizant 300MG er tablets was approved    PA Case: [id number], Status: Approved, Coverage Starts on: 10/19/2021 12:00:00 AM, Coverage Ends on: 10/19/2022 12:00:00 AM.

## 2021-11-01 DIAGNOSIS — G47.26 SHIFT WORK SLEEP DISORDER: ICD-10-CM

## 2021-11-01 DIAGNOSIS — G47.09 OTHER INSOMNIA: Primary | ICD-10-CM

## 2021-11-01 NOTE — TELEPHONE ENCOUNTER
Rx Refill Note  Requested Prescriptions     Pending Prescriptions Disp Refills   • modafinil (PROVIGIL) 100 MG tablet [Pharmacy Med Name: Modafinil 100 MG Oral Tablet] 30 tablet 0     Sig: Take 1 tablet by mouth once daily   • escitalopram (LEXAPRO) 20 MG tablet [Pharmacy Med Name: Escitalopram Oxalate 20 MG Oral Tablet] 30 tablet 0     Sig: Take 1 tablet by mouth once daily   • gabapentin (NEURONTIN) 300 MG capsule [Pharmacy Med Name: Gabapentin 300 MG Oral Capsule] 60 capsule 0     Sig: TAKE 1 CAPSULE BY MOUTH TWICE DAILY AS NEEDED (ARM  PAIN)   • zolpidem (AMBIEN) 5 MG tablet [Pharmacy Med Name: Zolpidem Tartrate 5 MG Oral Tablet] 30 tablet 0     Sig: TAKE 1 TABLET BY MOUTH ONCE DAILY AT BEDTIME AS NEEDED FOR SLEEP   • DULoxetine (CYMBALTA) 60 MG capsule [Pharmacy Med Name: DULoxetine HCl 60 MG Oral Capsule Delayed Release Particles] 90 capsule 0     Sig: Take 1 capsule by mouth once daily      Last office visit with prescribing clinician: 10/7/2021      Next office visit with prescribing clinician: Visit date not found     Comprehensive Metabolic Panel (03/18/2021 16:09)         Pratima Potts, RT  11/01/21, 14:19 EDT

## 2021-11-02 RX ORDER — ZOLPIDEM TARTRATE 5 MG/1
TABLET ORAL
Qty: 30 TABLET | Refills: 0 | Status: SHIPPED | OUTPATIENT
Start: 2021-11-02 | End: 2021-12-09 | Stop reason: SDUPTHER

## 2021-11-02 RX ORDER — MODAFINIL 100 MG/1
TABLET ORAL
Qty: 30 TABLET | Refills: 0 | Status: SHIPPED | OUTPATIENT
Start: 2021-11-02 | End: 2021-12-09 | Stop reason: ALTCHOICE

## 2021-11-02 RX ORDER — GABAPENTIN 300 MG/1
CAPSULE ORAL
Qty: 60 CAPSULE | Refills: 0 | Status: SHIPPED | OUTPATIENT
Start: 2021-11-02 | End: 2021-12-03

## 2021-11-02 RX ORDER — DULOXETIN HYDROCHLORIDE 60 MG/1
CAPSULE, DELAYED RELEASE ORAL
Qty: 90 CAPSULE | Refills: 0 | Status: SHIPPED | OUTPATIENT
Start: 2021-11-02 | End: 2021-12-09 | Stop reason: SDUPTHER

## 2021-11-02 RX ORDER — ESCITALOPRAM OXALATE 20 MG/1
TABLET ORAL
Qty: 30 TABLET | Refills: 0 | Status: SHIPPED | OUTPATIENT
Start: 2021-11-02 | End: 2021-12-03

## 2021-12-03 DIAGNOSIS — G47.26 SHIFT WORK SLEEP DISORDER: ICD-10-CM

## 2021-12-03 DIAGNOSIS — G47.09 OTHER INSOMNIA: ICD-10-CM

## 2021-12-03 RX ORDER — PRAZOSIN HYDROCHLORIDE 1 MG/1
CAPSULE ORAL
Qty: 90 CAPSULE | Refills: 0 | Status: SHIPPED | OUTPATIENT
Start: 2021-12-03 | End: 2022-04-13

## 2021-12-03 RX ORDER — MODAFINIL 100 MG/1
TABLET ORAL
Qty: 30 TABLET | Refills: 0 | OUTPATIENT
Start: 2021-12-03

## 2021-12-03 RX ORDER — ZOLPIDEM TARTRATE 5 MG/1
TABLET ORAL
Qty: 30 TABLET | Refills: 0 | OUTPATIENT
Start: 2021-12-03

## 2021-12-03 RX ORDER — GABAPENTIN 300 MG/1
CAPSULE ORAL
Qty: 60 CAPSULE | Refills: 0 | Status: SHIPPED | OUTPATIENT
Start: 2021-12-03 | End: 2021-12-31

## 2021-12-03 RX ORDER — ESCITALOPRAM OXALATE 20 MG/1
TABLET ORAL
Qty: 30 TABLET | Refills: 0 | Status: SHIPPED | OUTPATIENT
Start: 2021-12-03 | End: 2022-02-14 | Stop reason: SDUPTHER

## 2021-12-03 NOTE — TELEPHONE ENCOUNTER
Rx Refill Note  Requested Prescriptions     Pending Prescriptions Disp Refills   • gabapentin (NEURONTIN) 300 MG capsule [Pharmacy Med Name: Gabapentin 300 MG Oral Capsule] 60 capsule 0     Sig: TAKE 1 CAPSULE BY MOUTH  TWICE DAILY AS NEEDED FOR  ARM  PAIN   • prazosin (MINIPRESS) 1 MG capsule [Pharmacy Med Name: Prazosin HCl 1 MG Oral Capsule] 90 capsule 0     Sig: TAKE 1 CAPSULE BY MOUTH ONCE DAILY AT NIGHT   • modafinil (PROVIGIL) 100 MG tablet [Pharmacy Med Name: Modafinil 100 MG Oral Tablet] 30 tablet 0     Sig: Take 1 tablet by mouth once daily   • zolpidem (AMBIEN) 5 MG tablet [Pharmacy Med Name: Zolpidem Tartrate 5 MG Oral Tablet] 30 tablet 0     Sig: TAKE 1 TABLET BY MOUTH ONCE DAILY AT BEDTIME AS NEEDED FOR SLEEP   • escitalopram (LEXAPRO) 20 MG tablet [Pharmacy Med Name: Escitalopram Oxalate 20 MG Oral Tablet] 30 tablet 0     Sig: Take 1 tablet by mouth once daily      Last office visit with prescribing clinician: 10/7/2021      Next office visit with prescribing clinician: Visit date not found     Lipid Panel With / Chol / HDL Ratio (01/14/2021 13:46)         Pratima Potts, RT  12/03/21, 11:23 EST

## 2021-12-09 ENCOUNTER — OFFICE VISIT (OUTPATIENT)
Dept: FAMILY MEDICINE CLINIC | Facility: CLINIC | Age: 46
End: 2021-12-09

## 2021-12-09 ENCOUNTER — TELEPHONE (OUTPATIENT)
Dept: FAMILY MEDICINE CLINIC | Facility: CLINIC | Age: 46
End: 2021-12-09

## 2021-12-09 VITALS
HEIGHT: 62 IN | SYSTOLIC BLOOD PRESSURE: 121 MMHG | BODY MASS INDEX: 29.81 KG/M2 | OXYGEN SATURATION: 97 % | HEART RATE: 64 BPM | RESPIRATION RATE: 18 BRPM | WEIGHT: 162 LBS | DIASTOLIC BLOOD PRESSURE: 74 MMHG | TEMPERATURE: 97.1 F

## 2021-12-09 DIAGNOSIS — E78.5 DYSLIPIDEMIA: ICD-10-CM

## 2021-12-09 DIAGNOSIS — Z79.899 MEDICATION MANAGEMENT: Primary | ICD-10-CM

## 2021-12-09 DIAGNOSIS — R23.2 HOT FLASHES: ICD-10-CM

## 2021-12-09 DIAGNOSIS — F90.9 ADULT ADHD: ICD-10-CM

## 2021-12-09 DIAGNOSIS — F41.9 ANXIETY: ICD-10-CM

## 2021-12-09 DIAGNOSIS — G47.09 OTHER INSOMNIA: ICD-10-CM

## 2021-12-09 DIAGNOSIS — R73.09 ELEVATED GLUCOSE: ICD-10-CM

## 2021-12-09 DIAGNOSIS — R53.82 CHRONIC FATIGUE: ICD-10-CM

## 2021-12-09 PROCEDURE — 99214 OFFICE O/P EST MOD 30 MIN: CPT | Performed by: FAMILY MEDICINE

## 2021-12-09 RX ORDER — METHYLPHENIDATE HYDROCHLORIDE 27 MG/1
27 TABLET ORAL EVERY MORNING
Qty: 15 TABLET | Refills: 0 | Status: SHIPPED | OUTPATIENT
Start: 2021-12-09 | End: 2022-01-06

## 2021-12-09 RX ORDER — ZOLPIDEM TARTRATE 5 MG/1
5 TABLET ORAL NIGHTLY PRN
Qty: 30 TABLET | Refills: 0 | Status: SHIPPED | OUTPATIENT
Start: 2021-12-09 | End: 2022-02-01 | Stop reason: SDUPTHER

## 2021-12-09 RX ORDER — CELECOXIB 100 MG/1
100 CAPSULE ORAL DAILY PRN
COMMUNITY
Start: 2021-10-14 | End: 2021-12-31

## 2021-12-09 NOTE — TELEPHONE ENCOUNTER
HUB to read     PA approved for Methylphenidate HCl ER TBCR 27MG tablets    PA Case: [id number], Status: Approved, Coverage Starts on: 12/9/2021 12:00:00 AM, Coverage Ends on: 12/9/2022 12:00:00 AM.

## 2021-12-09 NOTE — PROGRESS NOTES
Subjective   Patito Merritt is a 45 y.o. female.     Chief Complaint   Patient presents with   • Follow-up   • Med Refill     talk about changing Provigil to Concerta   • Fatigue     starting pre-menopause       History of Present Illness   The patient provided verbal consent to the use of [facility] services during today's visit.     The patient presents today for follow up.     She complains of experiencing chronic fatigue and having several unfinished projects at home as a result of lack of energy after work. The patient attributes her fatigue to overworking. She relays that she goes to bed by 8:30 PM, rises at 3:30 AM and begins working at 5:00 AM. She states that she sleeps 6 to 8 hours if she does not experience sleep disturbances related to cold sweats. The patient is taking Ambien with significant improvement and reports waking feeling well rested. She has been taking Provigil for shift work disorder, which has helped but she is also having issues with focusing. She denies any sleep walking, sleep driving or sleep shopping. She is inquiring about starting Concerta.     The patient is taking prazosin as prescribed for PTSD. Additionally, she is taking Lexapro and duloxetine as prescribed without any issues. The patient states the recent increase in dosage has helped her significantly and relays that she is no longer dwelling on things that would upset her. She notes the prazosin helps with her restless leg syndrome.     She reports that she is actively going through menopause and complains of experiencing cold sweats and hot sweats throughout the nights. She describes feeling so hot that she becomes itchy. She is taking gabapentin as prescribed but denies any improvement to hot flashes. The patient has a Mirena IUD in place and does not have a monthly menstrual cycles. Her GYN suggested over the counter estrogen however, the patient reports that she is unable to afford the out of pocket cost at this time. She  has been using Evening Primrose for 3 days and has not noticed any changes in symptoms.    The patient is utilizing Carafate only as needed with noted improvement in GI symptoms.     She states that she is seeking a new psychiatrist to see. She reports previously seeing different therapists through [facility] however, she prefers to only have 1 consistent provider. She has a 25-year-old son and an 11-year-old daughter who live at home with her. There is a family history in her family including her mother. She is taking vitamin D once per week as prescribed. The patient relays that she was diagnosed with hyperactive disorder as a child. Both of her children have ADHD but do not take medications.   Her Adult ADHD screen showed evidence of ADHD  Will dc provigil and start trial concerta,  opiod med contract signed, The following portions of the patient's history were reviewed and updated as appropriate: allergies, current medications, past family history, past medical history, past social history, past surgical history and problem list.    Past Medical History:   Diagnosis Date   • Anxiety    • Anxiety disorder 7/23/2018   • Bipolar disorder (HCC)    • Bipolar disorder (HCC)    • Depression    • Fatigue    • Hyperkalemia    • Migraine    • PTSD (post-traumatic stress disorder)    • PTSD (post-traumatic stress disorder)    • Sleep disorder        Past Surgical History:   Procedure Laterality Date   • CLOSED REDUCTION RADIAL / ULNAR SHAFT FRACTURE  1999    radial fracture in right arm        Family History   Problem Relation Age of Onset   • Diabetes Mother    • Cancer Maternal Grandfather    • Cancer Paternal Grandfather    • Hypertension Father        Review of Systems  All system were reviewed are are negative otherwise what is listed in the HPI.    Objective   Physical Exam  General Appearance: alert, oriented x 3, no acute distress.  Skin: warm and dry.   HEENT: Atraumatic.  pupils round and reactive to light and  accommodation, oral mucosa pink and moist.  Nares patent without epistaxis.  External auditory canals are patent tympanic membranes intact.  Neck: supple, no JVD, trachea midline.  No thyromegaly  Lungs: CTA, unlabored breathing effort.  Heart: RRR, normal S1 and S2, no S3, no rub.  Abdomen: soft, non-tender, no palpable bladder, present bowel sounds to auscultation ×4.  No guarding or rigidity.  Extremities: no clubbing, cyanosis or edema.  Good range of motion actively and passively.  Symmetric muscle strength and development  Neuro: normal speech and mental status.  Cranial nerves II through XII intact.  No anosmia. DTR 2+; proprioception intact.  No focal motor/sensory deficits.    Vitals:    12/09/21 0913   BP: 121/74   Pulse: 64   Resp: 18   Temp: 97.1 °F (36.2 °C)   SpO2: 97%     Body mass index is 29.63 kg/m².    LDL Cholesterol    Date Value Ref Range Status   01/14/2021 148 (H) 0 - 100 mg/dL Final   10/01/2019 170 (H) 0 - 100 mg/dL Final     TSH   Date Value Ref Range Status   01/14/2021 0.512 0.270 - 4.200 uIU/mL Final   10/01/2019 1.500 0.340 - 5.600 uIU/mL Final     Comment:     Results may be falsely decreased if patient taking Biotin.     Results for orders placed or performed in visit on 10/07/21   POCT urinalysis dipstick, automated    Specimen: Urine   Result Value Ref Range    Color Yellow Yellow, Straw, Dark Yellow, Vanda    Clarity, UA Clear Clear    Specific Gravity  1.015 1.005 - 1.030    pH, Urine 7.0 5.0 - 8.0    Leukocytes Negative Negative    Nitrite, UA Negative Negative    Protein, POC Negative Negative mg/dL    Glucose, UA Negative Negative, 1000 mg/dL (3+) mg/dL    Ketones, UA Negative Negative    Urobilinogen, UA Normal Normal    Bilirubin Negative Negative    Blood, UA Negative Negative   Results for orders placed or performed during the hospital encounter of 03/18/21   Urinalysis, Microscopic Only - Urine, Clean Catch    Specimen: Urine, Clean Catch   Result Value Ref Range    RBC,  UA 0-2 (A) None Seen /HPF    WBC, UA 6-12 (A) None Seen /HPF    Bacteria, UA Trace (A) None Seen /HPF    Squamous Epithelial Cells, UA 7-12 (A) None Seen, 0-2 /HPF    Hyaline Casts, UA 3-6 None Seen /LPF    Methodology Automated Microscopy    Urinalysis With Microscopic If Indicated (No Culture) - Urine, Clean Catch    Specimen: Urine, Clean Catch   Result Value Ref Range    Color, UA Dark Yellow (A) Yellow, Straw    Appearance, UA Clear Clear    pH, UA 5.5 5.0 - 8.0    Specific Gravity, UA 1.028 1.005 - 1.030    Glucose, UA Negative Negative    Ketones, UA 15 mg/dL (1+) (A) Negative    Bilirubin, UA Small (1+) (A) Negative    Blood, UA Trace (A) Negative    Protein, UA Trace (A) Negative    Leuk Esterase, UA Negative Negative    Nitrite, UA Negative Negative    Urobilinogen, UA 0.2 E.U./dL 0.2 - 1.0 E.U./dL   CBC Auto Differential    Specimen: Blood   Result Value Ref Range    WBC 10.40 3.40 - 10.80 10*3/mm3    RBC 4.85 3.77 - 5.28 10*6/mm3    Hemoglobin 15.7 12.0 - 15.9 g/dL    Hematocrit 44.9 34.0 - 46.6 %    MCV 92.4 79.0 - 97.0 fL    MCH 32.4 26.6 - 33.0 pg    MCHC 35.0 31.5 - 35.7 g/dL    RDW 13.4 12.3 - 15.4 %    RDW-SD 43.3 37.0 - 54.0 fl    MPV 7.9 6.0 - 12.0 fL    Platelets 299 140 - 450 10*3/mm3    Neutrophil % 71.4 42.7 - 76.0 %    Lymphocyte % 20.3 19.6 - 45.3 %    Monocyte % 7.0 5.0 - 12.0 %    Eosinophil % 0.6 0.3 - 6.2 %    Basophil % 0.7 0.0 - 1.5 %    Neutrophils, Absolute 7.40 (H) 1.70 - 7.00 10*3/mm3    Lymphocytes, Absolute 2.10 0.70 - 3.10 10*3/mm3    Monocytes, Absolute 0.70 0.10 - 0.90 10*3/mm3    Eosinophils, Absolute 0.10 0.00 - 0.40 10*3/mm3    Basophils, Absolute 0.10 0.00 - 0.20 10*3/mm3    nRBC 0.1 0.0 - 0.2 /100 WBC   Lipase    Specimen: Blood   Result Value Ref Range    Lipase 26 13 - 60 U/L   Comprehensive Metabolic Panel    Specimen: Blood   Result Value Ref Range    Glucose 101 (H) 65 - 99 mg/dL    BUN 14 6 - 20 mg/dL    Creatinine 0.75 0.57 - 1.00 mg/dL    Sodium 140 136 - 145  mmol/L    Potassium 3.6 3.5 - 5.2 mmol/L    Chloride 102 98 - 107 mmol/L    CO2 23.0 22.0 - 29.0 mmol/L    Calcium 9.6 8.6 - 10.5 mg/dL    Total Protein 7.3 6.0 - 8.5 g/dL    Albumin 4.40 3.50 - 5.20 g/dL    ALT (SGPT) 12 1 - 33 U/L    AST (SGOT) 14 1 - 32 U/L    Alkaline Phosphatase 88 39 - 117 U/L    Total Bilirubin 0.5 0.0 - 1.2 mg/dL    eGFR Non African Amer 84 >60 mL/min/1.73    Globulin 2.9 gm/dL    A/G Ratio 1.5 g/dL    BUN/Creatinine Ratio 18.7 7.0 - 25.0    Anion Gap 15.0 5.0 - 15.0 mmol/L   Results for orders placed or performed in visit on 01/14/21   Lipid Panel With / Chol / HDL Ratio    Specimen: Blood   Result Value Ref Range    Total Cholesterol 212 (H) 0 - 200 mg/dL    Triglycerides 57 0 - 150 mg/dL    HDL Cholesterol 54 40 - 60 mg/dL    LDL Cholesterol  148 (H) 0 - 100 mg/dL    VLDL Cholesterol 10 5 - 40 mg/dL    Chol/HDL Ratio 3.93    TSH+Free T4    Specimen: Blood   Result Value Ref Range    TSH 0.512 0.270 - 4.200 uIU/mL    Free T4 1.17 0.93 - 1.70 ng/dL   Helicobacter Pylori, IgA IgG IgM    Specimen: Blood   Result Value Ref Range    H. pylori IgG 0.33 0.00 - 0.79 Index Value    H. pylori, IgA ABS <9.0 0.0 - 8.9 units    H. Pylori, IgM <9.0 0.0 - 8.9 units   CBC Auto Differential    Specimen: Blood   Result Value Ref Range    WBC 8.45 3.40 - 10.80 10*3/mm3    RBC 4.47 3.77 - 5.28 10*6/mm3    Hemoglobin 14.2 12.0 - 15.9 g/dL    Hematocrit 41.8 34.0 - 46.6 %    MCV 93.5 79.0 - 97.0 fL    MCH 31.8 26.6 - 33.0 pg    MCHC 34.0 31.5 - 35.7 g/dL    RDW 12.7 12.3 - 15.4 %    RDW-SD 43.1 37.0 - 54.0 fl    MPV 10.4 6.0 - 12.0 fL    Platelets 349 140 - 450 10*3/mm3    Neutrophil % 56.1 42.7 - 76.0 %    Lymphocyte % 32.5 19.6 - 45.3 %    Monocyte % 8.6 5.0 - 12.0 %    Eosinophil % 2.1 0.3 - 6.2 %    Basophil % 0.5 0.0 - 1.5 %    Immature Grans % 0.2 0.0 - 0.5 %    Neutrophils, Absolute 4.73 1.70 - 7.00 10*3/mm3    Lymphocytes, Absolute 2.75 0.70 - 3.10 10*3/mm3    Monocytes, Absolute 0.73 0.10 - 0.90  10*3/mm3    Eosinophils, Absolute 0.18 0.00 - 0.40 10*3/mm3    Basophils, Absolute 0.04 0.00 - 0.20 10*3/mm3    Immature Grans, Absolute 0.02 0.00 - 0.05 10*3/mm3    nRBC 0.0 0.0 - 0.2 /100 WBC     *Note: Due to a large number of results and/or encounters for the requested time period, some results have not been displayed. A complete set of results can be found in Results Review.       Assessment/Plan   Diagnoses and all orders for this visit:    1. Medication management (Primary)  -     Cancel: Urine Drug Screen - Urine, Clean Catch; Future  -     CBC & Differential  -     Comprehensive metabolic panel; Future  -     Comprehensive metabolic panel  -     POC Urine Drug Screen, Triage    2. Chronic fatigue  -     CBC & Differential  -     Comprehensive metabolic panel; Future  -     TSH+Free T4  -     Comprehensive metabolic panel    3. Dyslipidemia  -     Lipid Panel; Future  -     Lipid Panel    4. Elevated glucose  -     Hemoglobin A1c    5. Adult ADHD  -     methylphenidate (Concerta) 27 MG CR tablet; Take 1 tablet by mouth Every Morning  Dispense: 15 tablet; Refill: 0    6. Hot flashes    7. Anxiety    8. Other insomnia  -     zolpidem (AMBIEN) 5 MG tablet; Take 1 tablet by mouth At Night As Needed for Sleep.  Dispense: 30 tablet; Refill: 0    1. Medication management.  Will collect a urine drug screen in office today. Patient will return in 2 weeks for labs including;  -     CBC & Differential  -     Comprehensive metabolic panel  -     POC Urine Drug Screen, Triage    2. Chronic fatigue.  Patient to discontinue Provigil and begin taking Concerta 27 MG. A prescription was sent to her preferred pharmacy. Advised that she call with any issues. Patient to continue taking duloxetine as prescribed. Recommend that she seek evaluation with a therapist. Provided information regarding recommended psychiatrists to the patient today. Will collect labs including;  -     CBC & Differential  -     Comprehensive metabolic  panel; Future  -     TSH+Free T4  -     Comprehensive metabolic panel    3. Dyslipidemia.  Will obtain labs including a lipid panel.    4. Adult ADHD.  Patient to begin taking Concerta 27 MG. A prescription was sent to her preferred pharmacy. Advised that she call with any issues.    5. Hot flashes.  Patient to continue with use of Evening Primrose for hot flashes. Information provided to the patient today.     6. Anxiety.    7. Insomnia.  Patient to continue taking Ambien as prescribed. A refill was sent to her preferred pharmacy today.     8. Recommend that she strongly consider obtaining a Covid vaccine and discussed benefits.     The patient will follow up in 2 weeks to obtain labs and for reevaluation.       Transcribed from ambient dictation for Dr. Kim Medina by Fátima Gilman.   12/09/2021.   12:27 PM.

## 2021-12-09 NOTE — TELEPHONE ENCOUNTER
HUB to read    PA sent for Methylphenidate HCl ER TBCR 27MG tablets  Waiting on the outcome from insurance.

## 2021-12-30 ENCOUNTER — TELEPHONE (OUTPATIENT)
Dept: FAMILY MEDICINE CLINIC | Facility: CLINIC | Age: 46
End: 2021-12-30

## 2021-12-30 DIAGNOSIS — G62.9 NEUROPATHY: ICD-10-CM

## 2021-12-30 DIAGNOSIS — M79.2 RADICULAR PAIN IN RIGHT ARM: ICD-10-CM

## 2021-12-30 RX ORDER — OLANZAPINE 5 MG/1
5 TABLET ORAL NIGHTLY
Qty: 30 TABLET | Refills: 0 | Status: CANCELLED | OUTPATIENT
Start: 2021-12-30

## 2021-12-30 RX ORDER — GABAPENTIN ENACARBIL 600 MG/1
1 TABLET, EXTENDED RELEASE ORAL 2 TIMES DAILY PRN
Qty: 5 TABLET | Refills: 0 | Status: CANCELLED | COMMUNITY
Start: 2021-12-30

## 2021-12-30 NOTE — TELEPHONE ENCOUNTER
Caller: Patito Merritt    Relationship: Self    Best call back number: [phone]    Requested Prescriptions:   Requested Prescriptions     Pending Prescriptions Disp Refills   • Gabapentin Enacarbil ER (Horizant) 600 MG tablet controlled-release 5 tablet 0     Sig: Take 600 mg by mouth 2 (Two) Times a Day As Needed (pain).   • OLANZapine (ZyPREXA) 5 MG tablet 30 tablet 0     Sig: Take 1 tablet by mouth Every Night.        Pharmacy where request should be sent: [address] [phone] [facility] [phone] FX     Additional details provided by patient: PATIENT IS OUT OF MEDICATION , PATIENT WOULD LIKE TO GET BACK ON THE modafinil (PROVIGIL) 100 MG tablet . PATIENT DIDN'T LIKE HOW THE methylphenidate (Concerta) 27 MG CR tablet  IS MAKING HER FEEL . IT DIDN'T HELP HER FOCUS       Does the patient have less than a 3 day supply:  [x] Yes  [] No    Jed Booker Rep   12/30/21 09:06 EST

## 2021-12-30 NOTE — TELEPHONE ENCOUNTER
Rx Refill Note  Requested Prescriptions     Pending Prescriptions Disp Refills   • Gabapentin Enacarbil ER (Horizant) 600 MG tablet controlled-release 5 tablet 0     Sig: Take 600 mg by mouth 2 (Two) Times a Day As Needed (pain).   • OLANZapine (ZyPREXA) 5 MG tablet 30 tablet 0     Sig: Take 1 tablet by mouth Every Night.      Last office visit with prescribing clinician: 12/9/2021      Next office visit with prescribing clinician: Visit date not found     Comprehensive Metabolic Panel (03/18/2021 16:09)  Lipid Panel With / Chol / HDL Ratio (01/14/2021 13:46)  CBC & Differential (03/18/2021 16:09)         Nettie Lea, LIZ  12/30/21, 09:34 EST

## 2021-12-31 RX ORDER — MELOXICAM 15 MG/1
TABLET ORAL
Qty: 21 TABLET | Refills: 0 | Status: SHIPPED | OUTPATIENT
Start: 2021-12-31 | End: 2022-02-14 | Stop reason: SINTOL

## 2021-12-31 RX ORDER — HYDROXYZINE HYDROCHLORIDE 25 MG/1
TABLET, FILM COATED ORAL
Qty: 60 TABLET | Refills: 0 | Status: SHIPPED | OUTPATIENT
Start: 2021-12-31 | End: 2022-02-14 | Stop reason: SDUPTHER

## 2021-12-31 RX ORDER — CELECOXIB 100 MG/1
CAPSULE ORAL
Qty: 42 CAPSULE | Refills: 0 | Status: SHIPPED | OUTPATIENT
Start: 2021-12-31 | End: 2022-02-14

## 2021-12-31 RX ORDER — GABAPENTIN 300 MG/1
CAPSULE ORAL
Qty: 30 CAPSULE | Refills: 0 | Status: SHIPPED | OUTPATIENT
Start: 2021-12-31 | End: 2022-01-31 | Stop reason: SDUPTHER

## 2022-01-03 RX ORDER — OLANZAPINE 5 MG/1
5 TABLET ORAL NIGHTLY
Qty: 45 TABLET | Refills: 0 | Status: SHIPPED | OUTPATIENT
Start: 2022-01-03 | End: 2022-02-14 | Stop reason: SDUPTHER

## 2022-01-07 ENCOUNTER — TELEPHONE (OUTPATIENT)
Dept: FAMILY MEDICINE CLINIC | Facility: CLINIC | Age: 47
End: 2022-01-07

## 2022-01-07 DIAGNOSIS — G47.26 SHIFT WORK SLEEP DISORDER: Primary | ICD-10-CM

## 2022-01-07 RX ORDER — MODAFINIL 100 MG/1
100 TABLET ORAL DAILY
Qty: 30 TABLET | Refills: 0 | Status: SHIPPED | OUTPATIENT
Start: 2022-01-07 | End: 2022-03-07

## 2022-01-24 ENCOUNTER — TELEPHONE (OUTPATIENT)
Dept: FAMILY MEDICINE CLINIC | Facility: CLINIC | Age: 47
End: 2022-01-24

## 2022-01-24 RX ORDER — ZIPRASIDONE HYDROCHLORIDE 20 MG/1
20 CAPSULE ORAL 2 TIMES DAILY WITH MEALS
Qty: 60 CAPSULE | Refills: 0 | Status: SHIPPED | OUTPATIENT
Start: 2022-01-24 | End: 2022-02-01 | Stop reason: ALTCHOICE

## 2022-01-24 RX ORDER — ZIPRASIDONE HYDROCHLORIDE 20 MG/1
20 CAPSULE ORAL 2 TIMES DAILY WITH MEALS
Qty: 60 CAPSULE | Refills: 0 | Status: SHIPPED | OUTPATIENT
Start: 2022-01-24 | End: 2022-01-24 | Stop reason: SDUPTHER

## 2022-01-28 ENCOUNTER — APPOINTMENT (OUTPATIENT)
Dept: NEUROLOGY | Facility: HOSPITAL | Age: 47
End: 2022-01-28

## 2022-01-31 DIAGNOSIS — G47.09 OTHER INSOMNIA: ICD-10-CM

## 2022-01-31 DIAGNOSIS — M79.2 RADICULAR PAIN IN RIGHT ARM: ICD-10-CM

## 2022-01-31 DIAGNOSIS — G62.9 NEUROPATHY: ICD-10-CM

## 2022-01-31 RX ORDER — ZIPRASIDONE HYDROCHLORIDE 20 MG/1
20 CAPSULE ORAL 2 TIMES DAILY WITH MEALS
Qty: 60 CAPSULE | Refills: 0 | Status: CANCELLED | OUTPATIENT
Start: 2022-01-31

## 2022-02-01 DIAGNOSIS — G47.09 OTHER INSOMNIA: ICD-10-CM

## 2022-02-01 RX ORDER — DULOXETIN HYDROCHLORIDE 60 MG/1
60 CAPSULE, DELAYED RELEASE ORAL 2 TIMES DAILY
Qty: 60 CAPSULE | Refills: 0 | Status: SHIPPED | OUTPATIENT
Start: 2022-02-01 | End: 2022-02-14 | Stop reason: SDUPTHER

## 2022-02-01 RX ORDER — ZOLPIDEM TARTRATE 5 MG/1
5 TABLET ORAL NIGHTLY PRN
Qty: 30 TABLET | Refills: 0 | Status: CANCELLED | OUTPATIENT
Start: 2022-02-01

## 2022-02-01 RX ORDER — GABAPENTIN 300 MG/1
CAPSULE ORAL
Qty: 30 CAPSULE | Refills: 0 | Status: SHIPPED | OUTPATIENT
Start: 2022-02-01 | End: 2022-02-14 | Stop reason: DRUGHIGH

## 2022-02-01 NOTE — TELEPHONE ENCOUNTER
HUB ATTEMPTED TO WARM TRANSFER TO THE OFFICE AND WAS UNSUCCESSFUL  Patito Merritt (Self)    Best call back number: [phone] (H)    What is the best time to reach you: ANY TIME     Who are you requesting to speak with (clinical staff, provider,  specific staff member): CLINICAL STAFF      What was the call regarding:       PATIENT NEEDS TO BE CONTACTED TO LET HER KNOW LABS AND AN APPOINTMENT IS NEEDED BEFORE SHE CAN GET THESE REFILLED.    SHE STATES SHE IS COMPLETELY OUT AND IN THE PAST WE NORMALLY CONTACT HER TO LET HER KNOW AN APPT IS NEEDED BUT WE HAVEN'T DONE THAT YET AND SHE WAS ASKING ME ABOUT THESE REFILLS THIS MORNING.    THE NOTE FROM DR POLK IS NOT A HUB TO READ MESSAGE SO I WAS UNABLE TO DO THIS FOR THE PATIENT TODAY.   PLEASE ADVISE

## 2022-02-01 NOTE — TELEPHONE ENCOUNTER
Patient scheduled for appt on 2/14/22 (first available).  Completely out of medications and requesting refill ASAP on Horizant, Duloxetine, Neurontin, and Zolpidem.

## 2022-02-01 NOTE — TELEPHONE ENCOUNTER
HUB to share    Geodon removed from med list, so there's no confusion. As she's on Zyprexa now and can not take together w Geodon. Pharm was informed of this last wk. Cymbalta and Gabapentin were sent in.     Dr Medina , what about refills for the Horizant and Ambien?

## 2022-02-02 DIAGNOSIS — M79.2 RADICULAR PAIN IN RIGHT ARM: ICD-10-CM

## 2022-02-02 RX ORDER — GABAPENTIN ENACARBIL 600 MG/1
TABLET, EXTENDED RELEASE ORAL
Qty: 60 TABLET | Refills: 0 | OUTPATIENT
Start: 2022-02-02

## 2022-02-02 RX ORDER — GABAPENTIN ENACARBIL 600 MG/1
1 TABLET, EXTENDED RELEASE ORAL 2 TIMES DAILY PRN
Qty: 5 TABLET | Refills: 0 | OUTPATIENT
Start: 2022-02-02

## 2022-02-02 RX ORDER — ZOLPIDEM TARTRATE 5 MG/1
5 TABLET ORAL NIGHTLY PRN
Qty: 30 TABLET | Refills: 0 | Status: SHIPPED | OUTPATIENT
Start: 2022-02-02 | End: 2022-02-14 | Stop reason: SDUPTHER

## 2022-02-02 NOTE — TELEPHONE ENCOUNTER
HUB to share     Geodon removed from med list, so there's no confusion. As she's on Zyprexa now and can not take together w Geodon. Pharm was informed of this last wk. Cymbalta, Gabapentin, and Ambien were sent in. Horizant is the same as Gabapentin, just brand name.

## 2022-02-14 ENCOUNTER — TELEPHONE (OUTPATIENT)
Dept: FAMILY MEDICINE CLINIC | Facility: CLINIC | Age: 47
End: 2022-02-14

## 2022-02-14 ENCOUNTER — OFFICE VISIT (OUTPATIENT)
Dept: FAMILY MEDICINE CLINIC | Facility: CLINIC | Age: 47
End: 2022-02-14

## 2022-02-14 VITALS
BODY MASS INDEX: 29.63 KG/M2 | DIASTOLIC BLOOD PRESSURE: 70 MMHG | RESPIRATION RATE: 14 BRPM | TEMPERATURE: 98.7 F | OXYGEN SATURATION: 98 % | SYSTOLIC BLOOD PRESSURE: 123 MMHG | WEIGHT: 161 LBS | HEART RATE: 78 BPM | HEIGHT: 62 IN

## 2022-02-14 DIAGNOSIS — F90.9 ADULT ADHD: Primary | ICD-10-CM

## 2022-02-14 DIAGNOSIS — R23.2 HOT FLASHES: ICD-10-CM

## 2022-02-14 DIAGNOSIS — G47.26 SHIFT WORK SLEEP DISORDER: ICD-10-CM

## 2022-02-14 DIAGNOSIS — F43.10 PTSD (POST-TRAUMATIC STRESS DISORDER): ICD-10-CM

## 2022-02-14 DIAGNOSIS — E55.9 VITAMIN D DEFICIENCY: ICD-10-CM

## 2022-02-14 DIAGNOSIS — G47.09 OTHER INSOMNIA: ICD-10-CM

## 2022-02-14 DIAGNOSIS — F41.9 ANXIETY: ICD-10-CM

## 2022-02-14 DIAGNOSIS — Z79.899 MEDICATION MANAGEMENT: ICD-10-CM

## 2022-02-14 DIAGNOSIS — G62.9 NEUROPATHY: ICD-10-CM

## 2022-02-14 PROCEDURE — 80053 COMPREHEN METABOLIC PANEL: CPT | Performed by: FAMILY MEDICINE

## 2022-02-14 PROCEDURE — 82306 VITAMIN D 25 HYDROXY: CPT | Performed by: FAMILY MEDICINE

## 2022-02-14 PROCEDURE — 85025 COMPLETE CBC W/AUTO DIFF WBC: CPT | Performed by: FAMILY MEDICINE

## 2022-02-14 PROCEDURE — 99214 OFFICE O/P EST MOD 30 MIN: CPT | Performed by: FAMILY MEDICINE

## 2022-02-14 RX ORDER — ESCITALOPRAM OXALATE 20 MG/1
20 TABLET ORAL DAILY
Qty: 30 TABLET | Refills: 2 | Status: SHIPPED | OUTPATIENT
Start: 2022-02-14 | End: 2022-10-20

## 2022-02-14 RX ORDER — CELECOXIB 200 MG/1
200 CAPSULE ORAL 2 TIMES DAILY PRN
Qty: 60 CAPSULE | Refills: 2 | Status: SHIPPED | OUTPATIENT
Start: 2022-02-14 | End: 2022-09-12

## 2022-02-14 RX ORDER — HYDROXYZINE HYDROCHLORIDE 25 MG/1
TABLET, FILM COATED ORAL
Qty: 60 TABLET | Refills: 2 | Status: SHIPPED | OUTPATIENT
Start: 2022-02-14 | End: 2022-09-12 | Stop reason: SDUPTHER

## 2022-02-14 RX ORDER — HYDROXYZINE HYDROCHLORIDE 25 MG/1
TABLET, FILM COATED ORAL
Qty: 60 TABLET | Refills: 2 | Status: SHIPPED | OUTPATIENT
Start: 2022-02-14 | End: 2022-02-14 | Stop reason: SDUPTHER

## 2022-02-14 RX ORDER — GABAPENTIN 300 MG/1
CAPSULE ORAL
Qty: 30 CAPSULE | Refills: 0 | Status: SHIPPED | OUTPATIENT
Start: 2022-02-14 | End: 2022-02-14 | Stop reason: SDUPTHER

## 2022-02-14 RX ORDER — MELOXICAM 15 MG/1
15 TABLET ORAL DAILY
Qty: 30 TABLET | Refills: 2 | Status: SHIPPED | OUTPATIENT
Start: 2022-02-14 | End: 2022-09-12

## 2022-02-14 RX ORDER — OLANZAPINE 5 MG/1
5 TABLET ORAL 2 TIMES DAILY
Qty: 45 TABLET | Refills: 0 | Status: SHIPPED | OUTPATIENT
Start: 2022-02-14 | End: 2022-02-14 | Stop reason: SDUPTHER

## 2022-02-14 RX ORDER — ZOLPIDEM TARTRATE 5 MG/1
5 TABLET ORAL NIGHTLY PRN
Qty: 30 TABLET | Refills: 2 | Status: SHIPPED | OUTPATIENT
Start: 2022-02-14 | End: 2022-03-07

## 2022-02-14 RX ORDER — DULOXETIN HYDROCHLORIDE 60 MG/1
60 CAPSULE, DELAYED RELEASE ORAL 2 TIMES DAILY
Qty: 60 CAPSULE | Refills: 2 | Status: SHIPPED | OUTPATIENT
Start: 2022-02-14 | End: 2022-03-07

## 2022-02-14 RX ORDER — GABAPENTIN ENACARBIL 600 MG/1
1 TABLET, EXTENDED RELEASE ORAL DAILY
Qty: 30 TABLET | Refills: 3 | Status: SHIPPED | OUTPATIENT
Start: 2022-02-14 | End: 2022-09-12 | Stop reason: SDUPTHER

## 2022-02-14 RX ORDER — OLANZAPINE 5 MG/1
5 TABLET ORAL 2 TIMES DAILY
Qty: 60 TABLET | Refills: 2 | Status: SHIPPED | OUTPATIENT
Start: 2022-02-14 | End: 2022-03-11 | Stop reason: SDUPTHER

## 2022-02-14 RX ORDER — GABAPENTIN 300 MG/1
CAPSULE ORAL
Qty: 60 CAPSULE | Refills: 2 | Status: SHIPPED | OUTPATIENT
Start: 2022-02-14 | End: 2022-09-12 | Stop reason: SDUPTHER

## 2022-02-14 NOTE — PROGRESS NOTES
Venipuncture performed in left arm by Nettie Lea CMA  with good hemostasis. Patient tolerated well. 02/14/22 Nettie Lea CMA

## 2022-02-14 NOTE — TELEPHONE ENCOUNTER
HUB to read    PA was sent for OLANZapine 5MG tablets   Waiting on the outcome from insurance.

## 2022-02-14 NOTE — TELEPHONE ENCOUNTER
Pharmacy Name:  [facility]    Pharmacy representative name: ANTOINE    Pharmacy representative phone number: [phone]    What medication are you calling in regards to: hydrOXYzine (ATARAX) 25 MG tablet    What question does the pharmacy have: IT DOESN'T HAVE A DOSAGE ON THE REFILL    Who is the provider that prescribed the medication: DR. POLK    Additional notes: PLEASE RESEND OVER WITH DOSAGE OR CALL

## 2022-02-15 LAB
25(OH)D3 SERPL-MCNC: 24.3 NG/ML (ref 30–100)
ALBUMIN SERPL-MCNC: 4.6 G/DL (ref 3.5–5.2)
ALBUMIN/GLOB SERPL: 1.5 G/DL
ALP SERPL-CCNC: 99 U/L (ref 39–117)
ALT SERPL W P-5'-P-CCNC: 13 U/L (ref 1–33)
ANION GAP SERPL CALCULATED.3IONS-SCNC: 11 MMOL/L (ref 5–15)
AST SERPL-CCNC: 13 U/L (ref 1–32)
BASOPHILS # BLD AUTO: 0.04 10*3/MM3 (ref 0–0.2)
BASOPHILS NFR BLD AUTO: 0.4 % (ref 0–1.5)
BILIRUB SERPL-MCNC: 0.2 MG/DL (ref 0–1.2)
BUN SERPL-MCNC: 14 MG/DL (ref 6–20)
BUN/CREAT SERPL: 18.4 (ref 7–25)
CALCIUM SPEC-SCNC: 9.7 MG/DL (ref 8.6–10.5)
CHLORIDE SERPL-SCNC: 104 MMOL/L (ref 98–107)
CO2 SERPL-SCNC: 26 MMOL/L (ref 22–29)
CREAT SERPL-MCNC: 0.76 MG/DL (ref 0.57–1)
DEPRECATED RDW RBC AUTO: 43.7 FL (ref 37–54)
EOSINOPHIL # BLD AUTO: 0.19 10*3/MM3 (ref 0–0.4)
EOSINOPHIL NFR BLD AUTO: 1.9 % (ref 0.3–6.2)
ERYTHROCYTE [DISTWIDTH] IN BLOOD BY AUTOMATED COUNT: 13.2 % (ref 12.3–15.4)
GFR SERPL CREATININE-BSD FRML MDRD: 82 ML/MIN/1.73
GLOBULIN UR ELPH-MCNC: 3 GM/DL
GLUCOSE SERPL-MCNC: 79 MG/DL (ref 65–99)
HCT VFR BLD AUTO: 43.5 % (ref 34–46.6)
HGB BLD-MCNC: 14.9 G/DL (ref 12–15.9)
IMM GRANULOCYTES # BLD AUTO: 0.02 10*3/MM3 (ref 0–0.05)
IMM GRANULOCYTES NFR BLD AUTO: 0.2 % (ref 0–0.5)
LYMPHOCYTES # BLD AUTO: 3.37 10*3/MM3 (ref 0.7–3.1)
LYMPHOCYTES NFR BLD AUTO: 32.8 % (ref 19.6–45.3)
MCH RBC QN AUTO: 31.1 PG (ref 26.6–33)
MCHC RBC AUTO-ENTMCNC: 34.3 G/DL (ref 31.5–35.7)
MCV RBC AUTO: 90.8 FL (ref 79–97)
MONOCYTES # BLD AUTO: 0.82 10*3/MM3 (ref 0.1–0.9)
MONOCYTES NFR BLD AUTO: 8 % (ref 5–12)
NEUTROPHILS NFR BLD AUTO: 5.83 10*3/MM3 (ref 1.7–7)
NEUTROPHILS NFR BLD AUTO: 56.7 % (ref 42.7–76)
NRBC BLD AUTO-RTO: 0 /100 WBC (ref 0–0.2)
PLATELET # BLD AUTO: 361 10*3/MM3 (ref 140–450)
PMV BLD AUTO: 10.4 FL (ref 6–12)
POTASSIUM SERPL-SCNC: 4.2 MMOL/L (ref 3.5–5.2)
PROT SERPL-MCNC: 7.6 G/DL (ref 6–8.5)
RBC # BLD AUTO: 4.79 10*6/MM3 (ref 3.77–5.28)
SODIUM SERPL-SCNC: 141 MMOL/L (ref 136–145)
WBC NRBC COR # BLD: 10.27 10*3/MM3 (ref 3.4–10.8)

## 2022-02-15 NOTE — PROGRESS NOTES
Subjective   Patito Merritt is a 46 y.o. female.     Chief Complaint   Patient presents with   • Anxiety   • bipolar       History of Present Illness   The patient presents today for a follow-up.    She states she is out of almost all of her medications and is requesting a refill for the same. The only medications she has are Cymbalta, Ambien and prazosin. The patient takes Zyprexa 5 mg at night and requires a refill for the same. She also takes modafinil for attention deficit disorder with good effect. She needs a refill of modafinil. She was previously on Concerta but she did not like the way it made her feel. She takes Lexapro 20 mg in the morning and requires a refill for the same. She is doing well on Lexapro. She started seeing a therapist but she stopped going as she was not pleased with the care she was receiving. She denies ever seeing a psychiatrist. The patient is requesting a refill on Horizant and gabapentin. She states her insurance has been covering both gabapentin and Horizant. She has been taking Horizant daily and gabapentin as needed. She states she takes gabapentin when she experiences nerve pain in her right elbow. She saw a neurologist for the same and states they were not able to discover the etiology of her pain. The patient is also requesting a refill of ziprasidone and hydroxyzine. She states she takes ziprasidone in the morning. She is unsure which medication is more effective out of Zyprexa and ziprasidone. She has 4 more days worth of Cymbalta. She denies any side effects associated with her medications. She takes Cymbalta 60 mg twice daily. The patient also takes Ambien at night approximately 30 minutes before going to bed. She was last seen at [facility] in 12/2021. She states she takes meloxicam and Celebrex, but she does not take them at the same time as she alternates between the 2 every couple of days. She would prefer to take whichever medication is better for her stomach  due to her history of an ulcer in 2005. She reports exacerbation of the ulcer in 2021.     Her vitamin D level was low in 03/2021. She was previously prescribed vitamin D once weekly for the same. She cannot recall the exact dosage.     The patient complains of a tooth abscess and facial swelling. She cracked her tooth while brushing her teeth. She states her mother gave her Cipro, which provided slight relief. She denies any side effects associated with the Cipro. The patient states she has a dental appointment on 02/21/2022 and she is unable to be seen sooner. She reports a throbbing sensation in her head when bending over. The uses an oral analgesic 3 to 4 times per day.     The patient states hot flashes were discussed at her last appointment. She has been taking 1000 mg of evening primrose at night with good effect. She states she has been sleeping well and had not experienced any hot flashes since taking evening primrose.     The patient reports an allergy to PENICILLIN.     The following portions of the patient's history were reviewed and updated as appropriate: allergies, current medications, past family history, past medical history, past social history, past surgical history and problem list.    Past Medical History:   Diagnosis Date   • Anxiety    • Anxiety disorder 7/23/2018   • Bipolar disorder (HCC)    • Bipolar disorder (HCC)    • Depression    • Fatigue    • Hyperkalemia    • Migraine    • PTSD (post-traumatic stress disorder)    • PTSD (post-traumatic stress disorder)    • Sleep disorder        Past Surgical History:   Procedure Laterality Date   • CLOSED REDUCTION RADIAL / ULNAR SHAFT FRACTURE  1999    radial fracture in right arm        Family History   Problem Relation Age of Onset   • Diabetes Mother    • Cancer Maternal Grandfather    • Cancer Paternal Grandfather    • Hypertension Father        Review of Systems   Constitutional: Negative for fatigue, unexpected weight gain and unexpected  weight loss.   HENT: Negative for congestion, sinus pressure and sore throat.    Eyes: Negative for blurred vision and visual disturbance.   Respiratory: Negative for cough, shortness of breath and wheezing.    Cardiovascular: Negative for chest pain, palpitations and leg swelling.   Gastrointestinal: Negative for abdominal pain, constipation, diarrhea, nausea, vomiting, GERD and indigestion.   Musculoskeletal: Negative for arthralgias, back pain, gait problem, joint swelling and neck pain.   Skin: Negative for rash, skin lesions and wound.   Allergic/Immunologic: Negative for environmental allergies.   Neurological: Negative for dizziness, tremors, syncope, weakness, light-headedness, headache and memory problem.   Psychiatric/Behavioral: Negative for sleep disturbance, depressed mood and stress. The patient is not nervous/anxious.      Objective   Physical Exam  Vitals reviewed.   Constitutional:       Appearance: Normal appearance.   HENT:      Head: Normocephalic and atraumatic.      Nose: Nose normal.   Eyes:      Extraocular Movements: Extraocular movements intact.      Conjunctiva/sclera: Conjunctivae normal.   Pulmonary:      Effort: Pulmonary effort is normal. No respiratory distress.   Musculoskeletal:         General: Normal range of motion.   Skin:     General: Skin is warm and dry.   Neurological:      General: No focal deficit present.      Mental Status: She is alert and oriented to person, place, and time.      Cranial Nerves: No cranial nerve deficit.   Psychiatric:         Mood and Affect: Mood normal.         Behavior: Behavior normal.         Thought Content: Thought content normal.         Vitals:    02/14/22 1317   BP: 123/70   Pulse: 78   Resp: 14   Temp: 98.7 °F (37.1 °C)   SpO2: 98%     Body mass index is 29.45 kg/m².    LDL Cholesterol    Date Value Ref Range Status   01/14/2021 148 (H) 0 - 100 mg/dL Final   10/01/2019 170 (H) 0 - 100 mg/dL Final     TSH   Date Value Ref Range Status    01/14/2021 0.512 0.270 - 4.200 uIU/mL Final   10/01/2019 1.500 0.340 - 5.600 uIU/mL Final     Comment:     Results may be falsely decreased if patient taking Biotin.     Results for orders placed or performed in visit on 02/14/22   CBC Auto Differential    Specimen: Blood   Result Value Ref Range    WBC 10.27 3.40 - 10.80 10*3/mm3    RBC 4.79 3.77 - 5.28 10*6/mm3    Hemoglobin 14.9 12.0 - 15.9 g/dL    Hematocrit 43.5 34.0 - 46.6 %    MCV 90.8 79.0 - 97.0 fL    MCH 31.1 26.6 - 33.0 pg    MCHC 34.3 31.5 - 35.7 g/dL    RDW 13.2 12.3 - 15.4 %    RDW-SD 43.7 37.0 - 54.0 fl    MPV 10.4 6.0 - 12.0 fL    Platelets 361 140 - 450 10*3/mm3    Neutrophil % 56.7 42.7 - 76.0 %    Lymphocyte % 32.8 19.6 - 45.3 %    Monocyte % 8.0 5.0 - 12.0 %    Eosinophil % 1.9 0.3 - 6.2 %    Basophil % 0.4 0.0 - 1.5 %    Immature Grans % 0.2 0.0 - 0.5 %    Neutrophils, Absolute 5.83 1.70 - 7.00 10*3/mm3    Lymphocytes, Absolute 3.37 (H) 0.70 - 3.10 10*3/mm3    Monocytes, Absolute 0.82 0.10 - 0.90 10*3/mm3    Eosinophils, Absolute 0.19 0.00 - 0.40 10*3/mm3    Basophils, Absolute 0.04 0.00 - 0.20 10*3/mm3    Immature Grans, Absolute 0.02 0.00 - 0.05 10*3/mm3    nRBC 0.0 0.0 - 0.2 /100 WBC   Vitamin D 25 hydroxy    Specimen: Blood   Result Value Ref Range    25 Hydroxy, Vitamin D 24.3 (L) 30.0 - 100.0 ng/ml   Comprehensive Metabolic Panel    Specimen: Blood   Result Value Ref Range    Glucose 79 65 - 99 mg/dL    BUN 14 6 - 20 mg/dL    Creatinine 0.76 0.57 - 1.00 mg/dL    Sodium 141 136 - 145 mmol/L    Potassium 4.2 3.5 - 5.2 mmol/L    Chloride 104 98 - 107 mmol/L    CO2 26.0 22.0 - 29.0 mmol/L    Calcium 9.7 8.6 - 10.5 mg/dL    Total Protein 7.6 6.0 - 8.5 g/dL    Albumin 4.60 3.50 - 5.20 g/dL    ALT (SGPT) 13 1 - 33 U/L    AST (SGOT) 13 1 - 32 U/L    Alkaline Phosphatase 99 39 - 117 U/L    Total Bilirubin 0.2 0.0 - 1.2 mg/dL    eGFR Non African Amer 82 >60 mL/min/1.73    Globulin 3.0 gm/dL    A/G Ratio 1.5 g/dL    BUN/Creatinine Ratio 18.4 7.0 -  25.0    Anion Gap 11.0 5.0 - 15.0 mmol/L   Results for orders placed or performed in visit on 10/07/21   POCT urinalysis dipstick, automated    Specimen: Urine   Result Value Ref Range    Color Yellow Yellow, Straw, Dark Yellow, Vanda    Clarity, UA Clear Clear    Specific Gravity  1.015 1.005 - 1.030    pH, Urine 7.0 5.0 - 8.0    Leukocytes Negative Negative    Nitrite, UA Negative Negative    Protein, POC Negative Negative mg/dL    Glucose, UA Negative Negative, 1000 mg/dL (3+) mg/dL    Ketones, UA Negative Negative    Urobilinogen, UA Normal Normal    Bilirubin Negative Negative    Blood, UA Negative Negative   Results for orders placed or performed during the hospital encounter of 03/18/21   Urinalysis, Microscopic Only - Urine, Clean Catch    Specimen: Urine, Clean Catch   Result Value Ref Range    RBC, UA 0-2 (A) None Seen /HPF    WBC, UA 6-12 (A) None Seen /HPF    Bacteria, UA Trace (A) None Seen /HPF    Squamous Epithelial Cells, UA 7-12 (A) None Seen, 0-2 /HPF    Hyaline Casts, UA 3-6 None Seen /LPF    Methodology Automated Microscopy    Urinalysis With Microscopic If Indicated (No Culture) - Urine, Clean Catch    Specimen: Urine, Clean Catch   Result Value Ref Range    Color, UA Dark Yellow (A) Yellow, Straw    Appearance, UA Clear Clear    pH, UA 5.5 5.0 - 8.0    Specific Gravity, UA 1.028 1.005 - 1.030    Glucose, UA Negative Negative    Ketones, UA 15 mg/dL (1+) (A) Negative    Bilirubin, UA Small (1+) (A) Negative    Blood, UA Trace (A) Negative    Protein, UA Trace (A) Negative    Leuk Esterase, UA Negative Negative    Nitrite, UA Negative Negative    Urobilinogen, UA 0.2 E.U./dL 0.2 - 1.0 E.U./dL   CBC Auto Differential    Specimen: Blood   Result Value Ref Range    WBC 10.40 3.40 - 10.80 10*3/mm3    RBC 4.85 3.77 - 5.28 10*6/mm3    Hemoglobin 15.7 12.0 - 15.9 g/dL    Hematocrit 44.9 34.0 - 46.6 %    MCV 92.4 79.0 - 97.0 fL    MCH 32.4 26.6 - 33.0 pg    MCHC 35.0 31.5 - 35.7 g/dL    RDW 13.4 12.3 -  15.4 %    RDW-SD 43.3 37.0 - 54.0 fl    MPV 7.9 6.0 - 12.0 fL    Platelets 299 140 - 450 10*3/mm3    Neutrophil % 71.4 42.7 - 76.0 %    Lymphocyte % 20.3 19.6 - 45.3 %    Monocyte % 7.0 5.0 - 12.0 %    Eosinophil % 0.6 0.3 - 6.2 %    Basophil % 0.7 0.0 - 1.5 %    Neutrophils, Absolute 7.40 (H) 1.70 - 7.00 10*3/mm3    Lymphocytes, Absolute 2.10 0.70 - 3.10 10*3/mm3    Monocytes, Absolute 0.70 0.10 - 0.90 10*3/mm3    Eosinophils, Absolute 0.10 0.00 - 0.40 10*3/mm3    Basophils, Absolute 0.10 0.00 - 0.20 10*3/mm3    nRBC 0.1 0.0 - 0.2 /100 WBC   Lipase    Specimen: Blood   Result Value Ref Range    Lipase 26 13 - 60 U/L   Comprehensive Metabolic Panel    Specimen: Blood   Result Value Ref Range    Glucose 101 (H) 65 - 99 mg/dL    BUN 14 6 - 20 mg/dL    Creatinine 0.75 0.57 - 1.00 mg/dL    Sodium 140 136 - 145 mmol/L    Potassium 3.6 3.5 - 5.2 mmol/L    Chloride 102 98 - 107 mmol/L    CO2 23.0 22.0 - 29.0 mmol/L    Calcium 9.6 8.6 - 10.5 mg/dL    Total Protein 7.3 6.0 - 8.5 g/dL    Albumin 4.40 3.50 - 5.20 g/dL    ALT (SGPT) 12 1 - 33 U/L    AST (SGOT) 14 1 - 32 U/L    Alkaline Phosphatase 88 39 - 117 U/L    Total Bilirubin 0.5 0.0 - 1.2 mg/dL    eGFR Non African Amer 84 >60 mL/min/1.73    Globulin 2.9 gm/dL    A/G Ratio 1.5 g/dL    BUN/Creatinine Ratio 18.7 7.0 - 25.0    Anion Gap 15.0 5.0 - 15.0 mmol/L     *Note: Due to a large number of results and/or encounters for the requested time period, some results have not been displayed. A complete set of results can be found in Results Review.       Assessment/Plan   Diagnoses and all orders for this visit:    1. Adult ADHD (Primary)  -     CBC & Differential  -     Comprehensive Metabolic Panel    2. Shift work sleep disorder    3. Anxiety  -     CBC & Differential  -     Comprehensive Metabolic Panel    4. PTSD (post-traumatic stress disorder)  -     Cancel: CBC & Differential  -     Comprehensive metabolic panel; Future  -     Cancel: Comprehensive metabolic panel    5.  Medication management  -     Cancel: CBC & Differential  -     Comprehensive metabolic panel; Future  -     Urine Drug Screen - Urine, Clean Catch; Future  -     Cancel: Comprehensive metabolic panel    6. Vitamin D deficiency  -     Vitamin D 25 hydroxy; Future  -     Vitamin D 25 hydroxy    7. Other insomnia  -     zolpidem (AMBIEN) 5 MG tablet; Take 1 tablet by mouth At Night As Needed for Sleep.  Dispense: 30 tablet; Refill: 2  -     CBC & Differential  -     Comprehensive Metabolic Panel    8. Neuropathy  -     DULoxetine (CYMBALTA) 60 MG capsule; Take 1 capsule by mouth 2 (Two) Times a Day.  Dispense: 60 capsule; Refill: 2    9. Hot flashes  -     CBC & Differential  -     Comprehensive Metabolic Panel    Other orders  -     Discontinue: gabapentin (NEURONTIN) 300 MG capsule; Take 1 capsulel once daily as needed  Dispense: 30 capsule; Refill: 0  -     Gabapentin Enacarbil ER (Horizant) 600 MG tablet controlled-release; Take 600 mg by mouth Daily.  Dispense: 30 tablet; Refill: 3  -     Discontinue: hydrOXYzine (ATARAX) 25 MG tablet; for anxiety  Dispense: 60 tablet; Refill: 2  -     Discontinue: OLANZapine (ZyPREXA) 5 MG tablet; Take 1 tablet by mouth 2 (Two) Times a Day.  Dispense: 45 tablet; Refill: 0  -     escitalopram (LEXAPRO) 20 MG tablet; Take 1 tablet by mouth Daily.  Dispense: 30 tablet; Refill: 2  -     OLANZapine (ZyPREXA) 5 MG tablet; Take 1 tablet by mouth 2 (Two) Times a Day.  Dispense: 60 tablet; Refill: 2  -     gabapentin (NEURONTIN) 300 MG capsule; Take 1 capsulel  2x a day as needed for neuropathic pain  Dispense: 60 capsule; Refill: 2  -     celecoxib (CeleBREX) 200 MG capsule; Take 1 capsule by mouth 2 (Two) Times a Day As Needed for Mild Pain .  Dispense: 60 capsule; Refill: 2  -     meloxicam (MOBIC) 15 MG tablet; Take 1 tablet by mouth Daily.  Dispense: 30 tablet; Refill: 2      1. Medication management  - All requested medications have been refilled at her preferred pharmacy. I  explained to the patient that she cannot take ziprasidone in conjunction with Zyprexa. I advised her to discontinue ziprasidone. She may take Zyprexa twice daily. She was given the phone number to [facility] to establish care with a psychiatrist to help manage her medications. She will discontinue meloxicam and continue Celebrex due to concerns regarding her history of stomach ulcer. We are going to obtain labs today.     2. Vitamin D deficiency  - We are going to obtain labs to monitor her vitamin D levels today. She will continue vitamin D supplement.        Transcribed from ambient dictation for Kim Medina MD by Tierra Jackson.  02/15/22   14:56 EST    Patient verbalized consent to the visit recording.

## 2022-02-16 ENCOUNTER — TELEPHONE (OUTPATIENT)
Dept: FAMILY MEDICINE CLINIC | Facility: CLINIC | Age: 47
End: 2022-02-16

## 2022-02-16 NOTE — TELEPHONE ENCOUNTER
HUB to read    I have sent the information to  already letting her know that the patients insurance will only cover 1 po qd on the olanzapine but she has not responded to my message yet.     See other phone note from 2/14/22

## 2022-02-16 NOTE — TELEPHONE ENCOUNTER
Caller: [facility] [address] [place] [address] - [address] [place] [address] - [phone]  - [phone] FX (Pharmacy)    Reason for Call: [facility] CALLED REGARDING OLANZapine (ZyPREXA) 5 MG tablet. THE PHARMACY SAID THEY SENT A PA REQUEST AND IT WAS DENIED, SO SHE WAS CHECKING IF DR. POLK WANTED TO CHANGE THE DOSE OR JUST DISCONTINUE IT.  PLEASE ADVISE.

## 2022-03-07 ENCOUNTER — TELEPHONE (OUTPATIENT)
Dept: FAMILY MEDICINE CLINIC | Facility: CLINIC | Age: 47
End: 2022-03-07

## 2022-03-07 DIAGNOSIS — G47.26 SHIFT WORK SLEEP DISORDER: ICD-10-CM

## 2022-03-07 DIAGNOSIS — G47.09 OTHER INSOMNIA: ICD-10-CM

## 2022-03-07 DIAGNOSIS — G62.9 NEUROPATHY: ICD-10-CM

## 2022-03-07 RX ORDER — MODAFINIL 100 MG/1
TABLET ORAL
Qty: 30 TABLET | Refills: 0 | Status: SHIPPED | OUTPATIENT
Start: 2022-03-07 | End: 2022-04-13

## 2022-03-07 RX ORDER — DULOXETIN HYDROCHLORIDE 60 MG/1
CAPSULE, DELAYED RELEASE ORAL
Qty: 60 CAPSULE | Refills: 0 | Status: SHIPPED | OUTPATIENT
Start: 2022-03-07 | End: 2022-04-13

## 2022-03-07 RX ORDER — ZOLPIDEM TARTRATE 5 MG/1
5 TABLET ORAL NIGHTLY PRN
Qty: 30 TABLET | Refills: 0 | Status: SHIPPED | OUTPATIENT
Start: 2022-03-07 | End: 2022-04-13

## 2022-03-07 NOTE — TELEPHONE ENCOUNTER
Rx Refill Note  Requested Prescriptions     Pending Prescriptions Disp Refills   • modafinil (PROVIGIL) 100 MG tablet [Pharmacy Med Name: Modafinil 100 MG Oral Tablet] 30 tablet 0     Sig: Take 1 tablet by mouth once daily   • zolpidem (AMBIEN) 5 MG tablet [Pharmacy Med Name: Zolpidem Tartrate 5 MG Oral Tablet] 30 tablet 0     Sig: TAKE 1 TABLET BY MOUTH AT NIGHT AS NEEDED FOR SLEEP   • DULoxetine (CYMBALTA) 60 MG capsule [Pharmacy Med Name: DULoxetine HCl 60 MG Oral Capsule Delayed Release Particles] 60 capsule 0     Sig: Take 1 capsule by mouth twice daily      Last office visit with prescribing clinician: 2/14/2022      Next office visit with prescribing clinician: 5/16/2022     Office Visit with Kim Medina MD (02/14/2022)  Vitamin D 25 hydroxy (02/14/2022 14:38)  Comprehensive Metabolic Panel (02/14/2022 14:38)  CBC & Differential (02/14/2022 14:38)  SCANNED - LABS (02/14/2022)         Adams Mistry  03/07/22, 12:56 EST

## 2022-03-07 NOTE — TELEPHONE ENCOUNTER
----- Message from Kim Medina MD sent at 3/7/2022  5:07 AM EST -----  Labs gary low vit d, recommend daily vit d3 2000 u nits daily  Cholesterol elevated, low colesterol diet recommended, conside taking zetia 10 mg daily

## 2022-03-07 NOTE — TELEPHONE ENCOUNTER
HUB to read  My chart message was sent to the patient.     Labs show low vitamin d, recommend Over the counter daily vitamin d3 2000 units once daily   Cholesterol elevated, low colesterol diet recommended, consider taking zetia 10 mg daily.     (Let us know if you are ok with taking the zetia-cholesterol medication and what pharmacy you would like that to go to.)

## 2022-03-08 ENCOUNTER — TELEPHONE (OUTPATIENT)
Dept: FAMILY MEDICINE CLINIC | Facility: CLINIC | Age: 47
End: 2022-03-08

## 2022-03-08 NOTE — TELEPHONE ENCOUNTER
HUB to read    PA was approved for Modafinil 100MG tablets    PA Case: [id number], Status: Approved, Coverage Starts on: 3/8/2022 12:00:00 AM, Coverage Ends on: 3/8/2023 12:00:00 AM

## 2022-03-08 NOTE — TELEPHONE ENCOUNTER
HUB to read    PA was denied for Olanzapine 5MG tablets    Insurance will only cover for 1 po qd.         PA Case: [id number], Status: Denied. Notification: Completed.

## 2022-03-11 DIAGNOSIS — G47.09 OTHER INSOMNIA: ICD-10-CM

## 2022-03-11 RX ORDER — OLANZAPINE 5 MG/1
5 TABLET ORAL NIGHTLY
Qty: 30 TABLET | Refills: 2 | Status: SHIPPED | OUTPATIENT
Start: 2022-03-11 | End: 2022-09-12 | Stop reason: SDUPTHER

## 2022-04-11 DIAGNOSIS — G62.9 NEUROPATHY: ICD-10-CM

## 2022-04-11 DIAGNOSIS — G47.09 OTHER INSOMNIA: ICD-10-CM

## 2022-04-11 DIAGNOSIS — G47.26 SHIFT WORK SLEEP DISORDER: ICD-10-CM

## 2022-04-11 NOTE — TELEPHONE ENCOUNTER
Rx Refill Note  Requested Prescriptions     Pending Prescriptions Disp Refills   • modafinil (PROVIGIL) 100 MG tablet [Pharmacy Med Name: Modafinil 100 MG Oral Tablet] 30 tablet 0     Sig: Take 1 tablet by mouth once daily   • zolpidem (AMBIEN) 5 MG tablet [Pharmacy Med Name: Zolpidem Tartrate 5 MG Oral Tablet] 30 tablet 0     Sig: TAKE 1 TABLET BY MOUTH AT NIGHT AS NEEDED FOR SLEEP   • DULoxetine (CYMBALTA) 60 MG capsule [Pharmacy Med Name: DULoxetine HCl 60 MG Oral Capsule Delayed Release Particles] 60 capsule 0     Sig: Take 1 capsule by mouth twice daily   • prazosin (MINIPRESS) 1 MG capsule [Pharmacy Med Name: Prazosin HCl 1 MG Oral Capsule] 90 capsule 0     Sig: TAKE 1 CAPSULE BY MOUTH ONCE DAILY AT NIGHT      Last office visit with prescribing clinician: 2/14/2022      Next office visit with prescribing clinician: 5/16/2022     Comprehensive Metabolic Panel (02/14/2022 14:38)  CBC & Differential (02/14/2022 14:38)  Lipid Panel With / Chol / HDL Ratio (01/14/2021 13:46)         Nettie Lea CMA  04/11/22, 17:16 EDT

## 2022-04-13 ENCOUNTER — TELEPHONE (OUTPATIENT)
Dept: FAMILY MEDICINE CLINIC | Facility: CLINIC | Age: 47
End: 2022-04-13

## 2022-04-13 RX ORDER — MODAFINIL 100 MG/1
TABLET ORAL
Qty: 30 TABLET | Refills: 0 | Status: SHIPPED | OUTPATIENT
Start: 2022-04-13 | End: 2022-05-18

## 2022-04-13 RX ORDER — DULOXETIN HYDROCHLORIDE 60 MG/1
CAPSULE, DELAYED RELEASE ORAL
Qty: 60 CAPSULE | Refills: 0 | Status: SHIPPED | OUTPATIENT
Start: 2022-04-13 | End: 2022-05-18

## 2022-04-13 RX ORDER — ZOLPIDEM TARTRATE 5 MG/1
5 TABLET ORAL NIGHTLY PRN
Qty: 30 TABLET | Refills: 0 | Status: SHIPPED | OUTPATIENT
Start: 2022-04-13 | End: 2022-09-12 | Stop reason: SDUPTHER

## 2022-04-13 RX ORDER — PRAZOSIN HYDROCHLORIDE 1 MG/1
CAPSULE ORAL
Qty: 90 CAPSULE | Refills: 0 | Status: SHIPPED | OUTPATIENT
Start: 2022-04-13 | End: 2022-05-18

## 2022-04-13 NOTE — TELEPHONE ENCOUNTER
[facility] pharmacist called wanting to know if the patient is suppose to be taking the Duloxetine and Escitalopram.   I told the pharmacist that both meds are on her list but I would check with  first and would call her back.     [facility]: [phone]

## 2022-05-17 DIAGNOSIS — G62.9 NEUROPATHY: ICD-10-CM

## 2022-05-17 DIAGNOSIS — G47.26 SHIFT WORK SLEEP DISORDER: ICD-10-CM

## 2022-05-18 RX ORDER — DULOXETIN HYDROCHLORIDE 60 MG/1
CAPSULE, DELAYED RELEASE ORAL
Qty: 60 CAPSULE | Refills: 0 | Status: SHIPPED | OUTPATIENT
Start: 2022-05-18 | End: 2022-09-12 | Stop reason: SDUPTHER

## 2022-05-18 RX ORDER — PRAZOSIN HYDROCHLORIDE 1 MG/1
CAPSULE ORAL
Qty: 30 CAPSULE | Refills: 0 | Status: SHIPPED | OUTPATIENT
Start: 2022-05-18 | End: 2022-10-20

## 2022-05-18 RX ORDER — MODAFINIL 100 MG/1
TABLET ORAL
Qty: 30 TABLET | Refills: 0 | Status: SHIPPED | OUTPATIENT
Start: 2022-05-18 | End: 2022-09-12

## 2022-05-18 NOTE — TELEPHONE ENCOUNTER
Rx Refill Note  Requested Prescriptions     Pending Prescriptions Disp Refills   • modafinil (PROVIGIL) 100 MG tablet [Pharmacy Med Name: Modafinil 100 MG Oral Tablet] 30 tablet 2     Sig: Take 1 tablet by mouth once daily   • prazosin (MINIPRESS) 1 MG capsule [Pharmacy Med Name: Prazosin HCl 1 MG Oral Capsule] 90 capsule 0     Sig: TAKE 1 CAPSULE BY MOUTH ONCE DAILY AT NIGHT   • DULoxetine (CYMBALTA) 60 MG capsule [Pharmacy Med Name: DULoxetine HCl 60 MG Oral Capsule Delayed Release Particles] 60 capsule 2     Sig: Take 1 capsule by mouth twice daily      Last office visit with prescribing clinician: 2/14/2022      Next office visit with prescribing clinician: Visit date not found     Comprehensive Metabolic Panel (02/14/2022 14:38)         Pratima Potts, RT  05/18/22, 09:09 EDT

## 2022-09-12 ENCOUNTER — PATIENT ROUNDING (BHMG ONLY) (OUTPATIENT)
Dept: FAMILY MEDICINE CLINIC | Facility: CLINIC | Age: 47
End: 2022-09-12

## 2022-09-12 ENCOUNTER — OFFICE VISIT (OUTPATIENT)
Dept: FAMILY MEDICINE CLINIC | Facility: CLINIC | Age: 47
End: 2022-09-12

## 2022-09-12 VITALS
BODY MASS INDEX: 27.79 KG/M2 | HEIGHT: 62 IN | DIASTOLIC BLOOD PRESSURE: 70 MMHG | OXYGEN SATURATION: 98 % | HEART RATE: 70 BPM | WEIGHT: 151 LBS | SYSTOLIC BLOOD PRESSURE: 122 MMHG

## 2022-09-12 DIAGNOSIS — F31.62 BIPOLAR DISORDER, CURRENT EPISODE MIXED, MODERATE: Primary | ICD-10-CM

## 2022-09-12 DIAGNOSIS — G62.9 NEUROPATHY: ICD-10-CM

## 2022-09-12 DIAGNOSIS — Z12.11 SCREENING FOR MALIGNANT NEOPLASM OF COLON: ICD-10-CM

## 2022-09-12 DIAGNOSIS — F41.1 GENERALIZED ANXIETY DISORDER: ICD-10-CM

## 2022-09-12 DIAGNOSIS — F90.2 ATTENTION DEFICIT HYPERACTIVITY DISORDER (ADHD), COMBINED TYPE: ICD-10-CM

## 2022-09-12 DIAGNOSIS — Z12.31 SCREENING MAMMOGRAM FOR BREAST CANCER: ICD-10-CM

## 2022-09-12 DIAGNOSIS — G47.09 OTHER INSOMNIA: ICD-10-CM

## 2022-09-12 DIAGNOSIS — Z00.00 PREVENTATIVE HEALTH CARE: ICD-10-CM

## 2022-09-12 PROCEDURE — 80050 GENERAL HEALTH PANEL: CPT | Performed by: NURSE PRACTITIONER

## 2022-09-12 PROCEDURE — 80307 DRUG TEST PRSMV CHEM ANLYZR: CPT | Performed by: NURSE PRACTITIONER

## 2022-09-12 PROCEDURE — 80061 LIPID PANEL: CPT | Performed by: NURSE PRACTITIONER

## 2022-09-12 PROCEDURE — 86803 HEPATITIS C AB TEST: CPT | Performed by: NURSE PRACTITIONER

## 2022-09-12 PROCEDURE — 99214 OFFICE O/P EST MOD 30 MIN: CPT | Performed by: NURSE PRACTITIONER

## 2022-09-12 RX ORDER — HYDROXYZINE HYDROCHLORIDE 25 MG/1
TABLET, FILM COATED ORAL
Qty: 60 TABLET | Refills: 2 | Status: SHIPPED | OUTPATIENT
Start: 2022-09-12 | End: 2022-12-21 | Stop reason: SDUPTHER

## 2022-09-12 RX ORDER — DULOXETIN HYDROCHLORIDE 60 MG/1
60 CAPSULE, DELAYED RELEASE ORAL 2 TIMES DAILY
Qty: 60 CAPSULE | Refills: 1 | Status: SHIPPED | OUTPATIENT
Start: 2022-09-12 | End: 2022-11-18

## 2022-09-12 RX ORDER — DEXTROAMPHETAMINE SACCHARATE, AMPHETAMINE ASPARTATE, DEXTROAMPHETAMINE SULFATE AND AMPHETAMINE SULFATE 3.75; 3.75; 3.75; 3.75 MG/1; MG/1; MG/1; MG/1
15 TABLET ORAL DAILY
Qty: 30 TABLET | Refills: 0 | Status: SHIPPED | OUTPATIENT
Start: 2022-09-12 | End: 2022-10-20 | Stop reason: SDUPTHER

## 2022-09-12 RX ORDER — GABAPENTIN 300 MG/1
CAPSULE ORAL
Qty: 60 CAPSULE | Refills: 2 | Status: SHIPPED | OUTPATIENT
Start: 2022-09-12 | End: 2022-12-21 | Stop reason: SDUPTHER

## 2022-09-12 RX ORDER — OLANZAPINE 5 MG/1
5 TABLET ORAL NIGHTLY
Qty: 30 TABLET | Refills: 2 | Status: SHIPPED | OUTPATIENT
Start: 2022-09-12 | End: 2022-12-21

## 2022-09-12 RX ORDER — GABAPENTIN ENACARBIL 600 MG/1
1 TABLET, EXTENDED RELEASE ORAL DAILY
Qty: 30 TABLET | Refills: 3 | Status: SHIPPED | OUTPATIENT
Start: 2022-09-12 | End: 2022-11-18 | Stop reason: SDUPTHER

## 2022-09-12 RX ORDER — ZOLPIDEM TARTRATE 5 MG/1
5 TABLET ORAL NIGHTLY PRN
Qty: 30 TABLET | Refills: 0 | Status: SHIPPED | OUTPATIENT
Start: 2022-09-12 | End: 2022-10-18

## 2022-09-12 NOTE — PROGRESS NOTES
Venipuncture Blood Specimen Collection  Venipuncture performed in right arm by Donna Bradley MA with good hemostasis. Patient tolerated the procedure well without complications.   09/12/22   Donna Bradley MA

## 2022-09-12 NOTE — ASSESSMENT & PLAN NOTE
1.  Restart Zyprexa 5 mg  2.  Restart Cymbalta 60 mg twice daily  3.  Follow-up in 4 weeks, consider restarting Lexapro at that time  4.  Refer to Psychiatry patient to schedule appointment

## 2022-09-12 NOTE — ASSESSMENT & PLAN NOTE
1.  Reviewed ADHD screening questionnaire  2.  Update UDS  3.  Discontinue Provigil  4.  Start Adderall 15 mg daily

## 2022-09-12 NOTE — PROGRESS NOTES
"Chief Complaint  Follow-up (Follow up used to see Dr Medina. Needing medications has been out for several months since Dr Medina left. )    Subjective        Patito Merritt presents to [facility]  History of Present Illness    Patient presents to establish care with provider. Patient has Bipolar Disorder, not currently taking any medication. Patient has been out of medication for approximately 5 months.  She was recommended to see Psychiatry but they were scheduling 4-6 months out so patient did not schedule. Patient did some counseling with [facility] but does not want to go back. Patient previously taking Cymbalta 60mg BID, Lexapro 20mg daily, Zyprexa 5mg nightly, Prazosin 1mg nightly, Ambien 5mg nightly, and hydroxyzine PRN. She was also prescribed Provigil for ADHD, does not feel it was effective. Patient has also tried and failed Vyvanse, Concerta and Adderall. She reports the only thing that works for her is 15mg XR Adderall. Patient reports Provigil helped but she found having to \"double up\" here and there.       Patient is prescribed Horizant 600mg daily and Gabapentin 300mg PRN r/t neuropathy to right arm. She reports previous CT scan, EMG testing and was advised surgery versus oral medication.  Patient has been taking 600 mg of the Horizant and uses 300 mg of gabapentin as needed.    Patient has not had any Colon CA screening. She c/o irregular bowel movements, alternating diarrhea and constipation. Patient denies blood or mucus in stool. She does have grandfather with history of Colon CA. Patient is not UTD on Mammogram. She reports pap smear within the last year. Patient has Mirena intact.     PHQ-9 Depression Screening  Little interest or pleasure in doing things? 3-->nearly every day   Feeling down, depressed, or hopeless? 3-->nearly every day   Trouble falling or staying asleep, or sleeping too much? 3-->nearly every day   Feeling tired or having little energy? " "3-->nearly every day   Poor appetite or overeating? 3-->nearly every day   Feeling bad about yourself - or that you are a failure or have let yourself or your family down? 1-->several days   Trouble concentrating on things, such as reading the newspaper or watching television? 3-->nearly every day   Moving or speaking so slowly that other people could have noticed? Or the opposite - being so fidgety or restless that you have been moving around a lot more than usual? 3-->nearly every day   Thoughts that you would be better off dead, or of hurting yourself in some way? 1-->several days   PHQ-9 Total Score 23   If you checked off any problems, how difficult have these problems made it for you to do your work, take care of things at home, or get along with other people? somewhat difficult         Objective   Vital Signs:  /70 (BP Location: Left arm, Patient Position: Sitting, Cuff Size: Adult)   Pulse 70   Ht 157.5 cm (62\")   Wt 68.5 kg (151 lb)   SpO2 98%   BMI 27.62 kg/m²   Estimated body mass index is 27.62 kg/m² as calculated from the following:    Height as of this encounter: 157.5 cm (62\").    Weight as of this encounter: 68.5 kg (151 lb).          Physical Exam  Constitutional:       Appearance: Normal appearance.   HENT:      Head: Normocephalic.   Cardiovascular:      Rate and Rhythm: Normal rate and regular rhythm.   Pulmonary:      Effort: Pulmonary effort is normal.      Breath sounds: Normal breath sounds.   Abdominal:      General: Abdomen is flat. Bowel sounds are normal.      Palpations: Abdomen is soft.   Musculoskeletal:         General: Normal range of motion.      Cervical back: Neck supple.      Right lower leg: No edema.      Left lower leg: No edema.   Skin:     General: Skin is warm and dry.   Neurological:      Mental Status: She is alert and oriented to person, place, and time.      Gait: Gait is intact.   Psychiatric:         Attention and Perception: Attention normal.         Mood " and Affect: Mood normal.         Speech: Speech normal.        Result Review :    CMP    CMP 2/14/22   Glucose 79   BUN 14   Creatinine 0.76   eGFR Non African Am 82   Sodium 141   Potassium 4.2   Chloride 104   Calcium 9.7   Albumin 4.60   Total Bilirubin 0.2   Alkaline Phosphatase 99   AST (SGOT) 13   ALT (SGPT) 13           CBC    CBC 2/14/22   WBC 10.27   RBC 4.79   Hemoglobin 14.9   Hematocrit 43.5   MCV 90.8   MCH 31.1   MCHC 34.3   RDW 13.2   Platelets 361                             Assessment and Plan   Diagnoses and all orders for this visit:    1. Bipolar disorder, current episode mixed, moderate (HCC) (Primary)  Assessment & Plan:  1.  Restart Zyprexa 5 mg  2.  Restart Cymbalta 60 mg twice daily  3.  Follow-up in 4 weeks, consider restarting Lexapro at that time  4.  Refer to Psychiatry patient to schedule appointment    Orders:  -     Ambulatory Referral to Psychiatry    2. Generalized anxiety disorder  Assessment & Plan:  1.  May use hydroxyzine as needed    Orders:  -     Ambulatory Referral to Psychiatry    3. Attention deficit hyperactivity disorder (ADHD), combined type  Assessment & Plan:  1.  Reviewed ADHD screening questionnaire  2.  Update UDS  3.  Discontinue Provigil  4.  Start Adderall 15 mg daily    Orders:  -     Ambulatory Referral to Psychiatry  -     Urine Drug Screen - Urine, Clean Catch; Future  -     amphetamine-dextroamphetamine (Adderall) 15 MG tablet; Take 1 tablet by mouth Daily.  Dispense: 30 tablet; Refill: 0  -     Urine Drug Screen - Urine, Clean Catch    4. Neuropathy  Assessment & Plan:  1.  Restart gabapentin 300 mg as needed  2.  Restart Horizant 600 mg daily    Orders:  -     Ambulatory Referral to Psychiatry  -     DULoxetine (CYMBALTA) 60 MG capsule; Take 1 capsule by mouth 2 (Two) Times a Day.  Dispense: 60 capsule; Refill: 1    5. Preventative health care  Assessment & Plan:  1.  Mammogram ordered  2.  Colonoscopy referral sent  3.  Pap smear is  up-to-date    Orders:  -     CBC (No Diff)  -     Comprehensive Metabolic Panel  -     Lipid Panel  -     TSH  -     Hepatitis C Antibody    6. Screening mammogram for breast cancer  -     Mammo Screening Digital Tomosynthesis Bilateral With CAD; Future    7. Screening for malignant neoplasm of colon  Assessment & Plan:  1.  Refer for colonoscopy    Orders:  -     Ambulatory Referral For Screening Colonoscopy    8. Other insomnia  Assessment & Plan:  1.  Ambien 5 mg nightly as needed    Orders:  -     zolpidem (AMBIEN) 5 MG tablet; Take 1 tablet by mouth At Night As Needed for Sleep.  Dispense: 30 tablet; Refill: 0    Other orders  -     OLANZapine (ZyPREXA) 5 MG tablet; Take 1 tablet by mouth Every Night.  Dispense: 30 tablet; Refill: 2  -     hydrOXYzine (ATARAX) 25 MG tablet; Take 1 tablet 2x a day as needed for acute anxiety  Dispense: 60 tablet; Refill: 2  -     Gabapentin Enacarbil ER (Horizant) 600 MG tablet controlled-release; Take 600 mg by mouth Daily.  Dispense: 30 tablet; Refill: 3  -     gabapentin (NEURONTIN) 300 MG capsule; Take 1 capsulel  2x a day as needed for neuropathic pain  Dispense: 60 capsule; Refill: 2         I spent 35 minutes caring for Patito on this date of service. This time includes time spent by me in the following activities:preparing for the visit, reviewing tests, obtaining and/or reviewing a separately obtained history, performing a medically appropriate examination and/or evaluation , counseling and educating the patient/family/caregiver, ordering medications, tests, or procedures, referring and communicating with other health care professionals , documenting information in the medical record, independently interpreting results and communicating that information with the patient/family/caregiver and care coordination  Follow Up   Return in about 4 weeks (around 10/10/2022) for ADHD.  Patient was given instructions and counseling regarding her condition or for health maintenance  advice. Please see specific information pulled into the AVS if appropriate.

## 2022-09-13 LAB
ALBUMIN SERPL-MCNC: 4.2 G/DL (ref 3.5–5.2)
ALBUMIN/GLOB SERPL: 1.6 G/DL
ALP SERPL-CCNC: 86 U/L (ref 39–117)
ALT SERPL W P-5'-P-CCNC: 12 U/L (ref 1–33)
AMPHET+METHAMPHET UR QL: NEGATIVE
ANION GAP SERPL CALCULATED.3IONS-SCNC: 8.5 MMOL/L (ref 5–15)
AST SERPL-CCNC: 12 U/L (ref 1–32)
BARBITURATES UR QL SCN: NEGATIVE
BENZODIAZ UR QL SCN: NEGATIVE
BILIRUB SERPL-MCNC: 0.3 MG/DL (ref 0–1.2)
BUN SERPL-MCNC: 11 MG/DL (ref 6–20)
BUN/CREAT SERPL: 15.7 (ref 7–25)
CALCIUM SPEC-SCNC: 9.2 MG/DL (ref 8.6–10.5)
CANNABINOIDS SERPL QL: POSITIVE
CHLORIDE SERPL-SCNC: 102 MMOL/L (ref 98–107)
CHOLEST SERPL-MCNC: 234 MG/DL (ref 0–200)
CO2 SERPL-SCNC: 28.5 MMOL/L (ref 22–29)
COCAINE UR QL: NEGATIVE
CREAT SERPL-MCNC: 0.7 MG/DL (ref 0.57–1)
DEPRECATED RDW RBC AUTO: 49.5 FL (ref 37–54)
EGFRCR SERPLBLD CKD-EPI 2021: 108.8 ML/MIN/1.73
ERYTHROCYTE [DISTWIDTH] IN BLOOD BY AUTOMATED COUNT: 14.1 % (ref 12.3–15.4)
GLOBULIN UR ELPH-MCNC: 2.6 GM/DL
GLUCOSE SERPL-MCNC: 92 MG/DL (ref 65–99)
HCT VFR BLD AUTO: 44.2 % (ref 34–46.6)
HCV AB SER DONR QL: NORMAL
HDLC SERPL-MCNC: 47 MG/DL (ref 40–60)
HGB BLD-MCNC: 13.9 G/DL (ref 12–15.9)
LDLC SERPL CALC-MCNC: 174 MG/DL (ref 0–100)
LDLC/HDLC SERPL: 3.66 {RATIO}
MCH RBC QN AUTO: 29.7 PG (ref 26.6–33)
MCHC RBC AUTO-ENTMCNC: 31.4 G/DL (ref 31.5–35.7)
MCV RBC AUTO: 94.4 FL (ref 79–97)
METHADONE UR QL SCN: NEGATIVE
OPIATES UR QL: NEGATIVE
OXYCODONE UR QL SCN: NEGATIVE
PLATELET # BLD AUTO: 305 10*3/MM3 (ref 140–450)
PMV BLD AUTO: 11 FL (ref 6–12)
POTASSIUM SERPL-SCNC: 4.4 MMOL/L (ref 3.5–5.2)
PROT SERPL-MCNC: 6.8 G/DL (ref 6–8.5)
RBC # BLD AUTO: 4.68 10*6/MM3 (ref 3.77–5.28)
SODIUM SERPL-SCNC: 139 MMOL/L (ref 136–145)
TRIGL SERPL-MCNC: 74 MG/DL (ref 0–150)
TSH SERPL DL<=0.05 MIU/L-ACNC: 1.62 UIU/ML (ref 0.27–4.2)
VLDLC SERPL-MCNC: 13 MG/DL (ref 5–40)
WBC NRBC COR # BLD: 7.93 10*3/MM3 (ref 3.4–10.8)

## 2022-09-22 ENCOUNTER — HOSPITAL ENCOUNTER (OUTPATIENT)
Dept: MAMMOGRAPHY | Facility: HOSPITAL | Age: 47
Discharge: HOME | End: 2022-09-22
Admitting: NURSE PRACTITIONER

## 2022-09-22 DIAGNOSIS — R92.8 ABNORMAL MAMMOGRAM OF LEFT BREAST: Primary | ICD-10-CM

## 2022-09-22 DIAGNOSIS — Z12.31 SCREENING MAMMOGRAM FOR BREAST CANCER: ICD-10-CM

## 2022-09-22 PROCEDURE — 77063 BREAST TOMOSYNTHESIS BI: CPT

## 2022-09-22 PROCEDURE — 77067 SCR MAMMO BI INCL CAD: CPT

## 2022-09-30 ENCOUNTER — HOSPITAL ENCOUNTER (OUTPATIENT)
Dept: ULTRASOUND IMAGING | Facility: HOSPITAL | Age: 47
Discharge: HOME | End: 2022-09-30

## 2022-09-30 ENCOUNTER — HOSPITAL ENCOUNTER (OUTPATIENT)
Dept: MAMMOGRAPHY | Facility: HOSPITAL | Age: 47
Discharge: HOME | End: 2022-09-30

## 2022-09-30 DIAGNOSIS — R92.8 ABNORMAL MAMMOGRAM OF LEFT BREAST: ICD-10-CM

## 2022-09-30 PROCEDURE — G0279 TOMOSYNTHESIS, MAMMO: HCPCS

## 2022-09-30 PROCEDURE — 77065 DX MAMMO INCL CAD UNI: CPT

## 2022-09-30 PROCEDURE — 76642 ULTRASOUND BREAST LIMITED: CPT

## 2022-10-17 DIAGNOSIS — G47.09 OTHER INSOMNIA: ICD-10-CM

## 2022-10-18 RX ORDER — ZOLPIDEM TARTRATE 5 MG/1
5 TABLET ORAL NIGHTLY PRN
Qty: 30 TABLET | Refills: 0 | Status: SHIPPED | OUTPATIENT
Start: 2022-10-18 | End: 2022-11-18 | Stop reason: SDUPTHER

## 2022-10-20 ENCOUNTER — OFFICE VISIT (OUTPATIENT)
Dept: FAMILY MEDICINE CLINIC | Facility: CLINIC | Age: 47
End: 2022-10-20

## 2022-10-20 VITALS
SYSTOLIC BLOOD PRESSURE: 124 MMHG | BODY MASS INDEX: 28.16 KG/M2 | DIASTOLIC BLOOD PRESSURE: 70 MMHG | OXYGEN SATURATION: 98 % | HEIGHT: 62 IN | HEART RATE: 68 BPM | WEIGHT: 153 LBS

## 2022-10-20 DIAGNOSIS — F90.2 ATTENTION DEFICIT HYPERACTIVITY DISORDER (ADHD), COMBINED TYPE: ICD-10-CM

## 2022-10-20 DIAGNOSIS — Z00.00 PREVENTATIVE HEALTH CARE: ICD-10-CM

## 2022-10-20 DIAGNOSIS — G47.09 OTHER INSOMNIA: ICD-10-CM

## 2022-10-20 DIAGNOSIS — F31.62 BIPOLAR DISORDER, CURRENT EPISODE MIXED, MODERATE: Primary | ICD-10-CM

## 2022-10-20 DIAGNOSIS — G62.9 NEUROPATHY: ICD-10-CM

## 2022-10-20 DIAGNOSIS — F41.1 GENERALIZED ANXIETY DISORDER: ICD-10-CM

## 2022-10-20 PROCEDURE — 99214 OFFICE O/P EST MOD 30 MIN: CPT | Performed by: NURSE PRACTITIONER

## 2022-10-20 RX ORDER — DEXTROAMPHETAMINE SACCHARATE, AMPHETAMINE ASPARTATE, DEXTROAMPHETAMINE SULFATE AND AMPHETAMINE SULFATE 3.75; 3.75; 3.75; 3.75 MG/1; MG/1; MG/1; MG/1
15 TABLET ORAL DAILY
Qty: 30 TABLET | Refills: 0 | Status: SHIPPED | OUTPATIENT
Start: 2022-10-20 | End: 2022-11-18 | Stop reason: SDUPTHER

## 2022-10-20 NOTE — PROGRESS NOTES
"Chief Complaint  Follow-up (4 week follow up ADHD)    Subjective        Patito Merritt presents to [facility]  History of Present Illness    Patient presents for f/u visit. She was seen on 9/12 regarding bipolar disorder and restarted on Cymbalta and Zyprexa.  Patient reports overall mood is much improved.  She is using hydroxyzine as needed for anxiety.  Provigil was discontinued and patient started on Adderall for ADHD.  She is taking as prescribed and reports symptoms are improving.  Patient is taking Ambien nightly for insomnia, reports sleeping relatively well. Patient will see Psychiatry in April.    Patient is taking Horizant and Gabapentin for neuropathy to right arm. She reports symptoms are stable, does not wish to proceed with surgical options at this time.      Mammogram completed, showed benign findings and will be repeated again in 1 year.  Patient reports she mailed back for colonoscopy packet, not yet scheduled.  Objective   Vital Signs:  /70 (BP Location: Left arm, Patient Position: Sitting, Cuff Size: Adult)   Pulse 68   Ht 157.5 cm (62\")   Wt 69.4 kg (153 lb)   SpO2 98%   BMI 27.98 kg/m²   Estimated body mass index is 27.98 kg/m² as calculated from the following:    Height as of this encounter: 157.5 cm (62\").    Weight as of this encounter: 69.4 kg (153 lb).          Physical Exam  Constitutional:       Appearance: Normal appearance.   HENT:      Head: Normocephalic.   Cardiovascular:      Rate and Rhythm: Normal rate and regular rhythm.   Pulmonary:      Effort: Pulmonary effort is normal.      Breath sounds: Normal breath sounds.   Abdominal:      General: Abdomen is flat. Bowel sounds are normal.      Palpations: Abdomen is soft.   Musculoskeletal:         General: Normal range of motion.      Cervical back: Neck supple.      Right lower leg: No edema.      Left lower leg: No edema.   Skin:     General: Skin is warm and dry.   Neurological:      " Mental Status: She is alert and oriented to person, place, and time.      Gait: Gait is intact.   Psychiatric:         Attention and Perception: Attention normal.         Mood and Affect: Mood normal.         Speech: Speech normal.        Result Review :    CMP    CMP 2/14/22 9/12/22   Glucose 79 92   BUN 14 11   Creatinine 0.76 0.70   eGFR Non African Am 82    Sodium 141 139   Potassium 4.2 4.4   Chloride 104 102   Calcium 9.7 9.2   Albumin 4.60 4.20   Total Bilirubin 0.2 0.3   Alkaline Phosphatase 99 86   AST (SGOT) 13 12   ALT (SGPT) 13 12           CBC    CBC 2/14/22 9/12/22   WBC 10.27 7.93   RBC 4.79 4.68   Hemoglobin 14.9 13.9   Hematocrit 43.5 44.2   MCV 90.8 94.4   MCH 31.1 29.7   MCHC 34.3 31.4 (A)   RDW 13.2 14.1   Platelets 361 305   (A) Abnormal value            Lipid Panel    Lipid Panel 9/12/22   Total Cholesterol 234 (A)   Triglycerides 74   HDL Cholesterol 47   VLDL Cholesterol 13   LDL Cholesterol  174 (A)   LDL/HDL Ratio 3.66   (A) Abnormal value            TSH    TSH 9/12/22   TSH 1.620           Data reviewed: Radiologic studies Mammogram          Assessment and Plan   Diagnoses and all orders for this visit:    1. Bipolar disorder, current episode mixed, moderate (HCC) (Primary)  Assessment & Plan:  1.  Stable  2.  Continue Cymbalta and Zyprexa as prescribed  3.  Follow-up appointment with psychiatry      2. Attention deficit hyperactivity disorder (ADHD), combined type  Assessment & Plan:  1.  Improving  2.  Continue Adderall as prescribed  3.  Follow-up in 3 months    Orders:  -     amphetamine-dextroamphetamine (Adderall) 15 MG tablet; Take 1 tablet by mouth Daily.  Dispense: 30 tablet; Refill: 0    3. Generalized anxiety disorder  Assessment & Plan:  1.  Stable  2.  Continue hydroxyzine as needed      4. Neuropathy  Assessment & Plan:  1.  Continue Horizant and gabapentin as prescribed  2.  Refer back to Ortho if indicated      5. Other insomnia  Assessment & Plan:  1.  Continue Ambien  nightly as needed      6. Preventative health care  Assessment & Plan:  1.  Colonoscopy to be scheduled  2.  Reviewed mammogram, repeat in 1 year           I spent 30 minutes caring for Patito on this date of service. This time includes time spent by me in the following activities:preparing for the visit, reviewing tests, obtaining and/or reviewing a separately obtained history, performing a medically appropriate examination and/or evaluation , counseling and educating the patient/family/caregiver, ordering medications, tests, or procedures, documenting information in the medical record, independently interpreting results and communicating that information with the patient/family/caregiver and care coordination  Follow Up   Return in about 3 months (around 1/20/2023) for ADHD.  Patient was given instructions and counseling regarding her condition or for health maintenance advice. Please see specific information pulled into the AVS if appropriate.       Answers for HPI/ROS submitted by the patient on 10/14/2022  Please describe your symptoms.: Appointment for my medication., Continued numbness in my right arm, knee and back pain.  Have you had these symptoms before?: Yes  How long have you been having these symptoms?: 1-2 weeks  Please list any medications you are currently taking for this condition.: See list  What is the primary reason for your visit?: Other

## 2022-10-20 NOTE — ASSESSMENT & PLAN NOTE
1.  Stable  2.  Continue Cymbalta and Zyprexa as prescribed  3.  Follow-up appointment with psychiatry

## 2022-11-18 DIAGNOSIS — G62.9 NEUROPATHY: ICD-10-CM

## 2022-11-18 DIAGNOSIS — F90.2 ATTENTION DEFICIT HYPERACTIVITY DISORDER (ADHD), COMBINED TYPE: ICD-10-CM

## 2022-11-18 DIAGNOSIS — G47.09 OTHER INSOMNIA: ICD-10-CM

## 2022-11-18 RX ORDER — DEXTROAMPHETAMINE SACCHARATE, AMPHETAMINE ASPARTATE, DEXTROAMPHETAMINE SULFATE AND AMPHETAMINE SULFATE 3.75; 3.75; 3.75; 3.75 MG/1; MG/1; MG/1; MG/1
15 TABLET ORAL DAILY
Qty: 30 TABLET | Refills: 0 | Status: SHIPPED | OUTPATIENT
Start: 2022-11-18 | End: 2022-12-21 | Stop reason: SDUPTHER

## 2022-11-18 RX ORDER — ZOLPIDEM TARTRATE 5 MG/1
5 TABLET ORAL NIGHTLY PRN
Qty: 30 TABLET | Refills: 0 | Status: SHIPPED | OUTPATIENT
Start: 2022-11-18 | End: 2022-12-21

## 2022-11-18 RX ORDER — DULOXETIN HYDROCHLORIDE 60 MG/1
CAPSULE, DELAYED RELEASE ORAL
Qty: 60 CAPSULE | Refills: 0 | Status: SHIPPED | OUTPATIENT
Start: 2022-11-18 | End: 2022-12-21

## 2022-11-18 RX ORDER — GABAPENTIN ENACARBIL 600 MG/1
1 TABLET, EXTENDED RELEASE ORAL DAILY
Qty: 30 TABLET | Refills: 3 | Status: SHIPPED | OUTPATIENT
Start: 2022-11-18 | End: 2022-12-21 | Stop reason: SDUPTHER

## 2022-11-18 NOTE — TELEPHONE ENCOUNTER
Caller: Patito Merritt    Relationship: Self    Best call back number: [phone]    Requested Prescriptions:   Requested Prescriptions     Pending Prescriptions Disp Refills   • Gabapentin Enacarbil ER (Horizant) 600 MG tablet controlled-release 30 tablet 3     Sig: Take 600 mg by mouth Daily.   • zolpidem (AMBIEN) 5 MG tablet 30 tablet 0     Sig: Take 1 tablet by mouth At Night As Needed for Sleep.   • amphetamine-dextroamphetamine (Adderall) 15 MG tablet 30 tablet 0     Sig: Take 1 tablet by mouth Daily.        Pharmacy where request should be sent: [facility] [address] [place] [address] [phone] [facility] [phone]      Additional details provided by patient:PATIENT IS OUT OF [facility].... PATIENT STATES THAT PHARMACY TOLD HER THEY HAD SENT OVER PA FOR THE MEDICATION ON 11/12/22    Does the patient have less than a 3 day supply:  [] Yes  [] No    Jed De Jesus Rep   11/18/22 12:13 EST

## 2022-12-07 ENCOUNTER — ON CAMPUS - OUTPATIENT (OUTPATIENT)
Dept: URBAN - METROPOLITAN AREA HOSPITAL 2 | Facility: HOSPITAL | Age: 47
End: 2022-12-07
Payer: COMMERCIAL

## 2022-12-07 ENCOUNTER — OFFICE (OUTPATIENT)
Dept: URBAN - METROPOLITAN AREA PATHOLOGY 4 | Facility: PATHOLOGY | Age: 47
End: 2022-12-07
Payer: COMMERCIAL

## 2022-12-07 VITALS
SYSTOLIC BLOOD PRESSURE: 99 MMHG | RESPIRATION RATE: 16 BRPM | OXYGEN SATURATION: 95 % | OXYGEN SATURATION: 97 % | TEMPERATURE: 97.5 F | SYSTOLIC BLOOD PRESSURE: 103 MMHG | HEART RATE: 120 BPM | RESPIRATION RATE: 17 BRPM | OXYGEN SATURATION: 99 % | HEART RATE: 138 BPM | HEART RATE: 129 BPM | DIASTOLIC BLOOD PRESSURE: 79 MMHG | OXYGEN SATURATION: 96 % | HEIGHT: 62 IN | SYSTOLIC BLOOD PRESSURE: 102 MMHG | HEART RATE: 100 BPM | SYSTOLIC BLOOD PRESSURE: 132 MMHG | SYSTOLIC BLOOD PRESSURE: 92 MMHG | DIASTOLIC BLOOD PRESSURE: 92 MMHG | DIASTOLIC BLOOD PRESSURE: 84 MMHG | WEIGHT: 142 LBS | HEART RATE: 132 BPM | RESPIRATION RATE: 18 BRPM | SYSTOLIC BLOOD PRESSURE: 111 MMHG | SYSTOLIC BLOOD PRESSURE: 112 MMHG | SYSTOLIC BLOOD PRESSURE: 96 MMHG | HEART RATE: 128 BPM | DIASTOLIC BLOOD PRESSURE: 69 MMHG | DIASTOLIC BLOOD PRESSURE: 63 MMHG | HEART RATE: 118 BPM | DIASTOLIC BLOOD PRESSURE: 55 MMHG | HEART RATE: 124 BPM | DIASTOLIC BLOOD PRESSURE: 68 MMHG

## 2022-12-07 DIAGNOSIS — Z12.11 ENCOUNTER FOR SCREENING FOR MALIGNANT NEOPLASM OF COLON: ICD-10-CM

## 2022-12-07 DIAGNOSIS — D12.4 BENIGN NEOPLASM OF DESCENDING COLON: ICD-10-CM

## 2022-12-07 DIAGNOSIS — K62.1 RECTAL POLYP: ICD-10-CM

## 2022-12-07 DIAGNOSIS — K57.30 DIVERTICULOSIS OF LARGE INTESTINE WITHOUT PERFORATION OR ABS: ICD-10-CM

## 2022-12-07 PROBLEM — K63.5 POLYP OF COLON: Status: ACTIVE | Noted: 2022-12-07

## 2022-12-07 LAB
GI HISTOLOGY: A. UNSPECIFIED: (no result)
GI HISTOLOGY: B. UNSPECIFIED: (no result)
GI HISTOLOGY: PDF REPORT: (no result)

## 2022-12-07 PROCEDURE — 88305 TISSUE EXAM BY PATHOLOGIST: CPT | Performed by: INTERNAL MEDICINE

## 2022-12-07 PROCEDURE — 45385 COLONOSCOPY W/LESION REMOVAL: CPT | Mod: 33 | Performed by: INTERNAL MEDICINE

## 2022-12-21 DIAGNOSIS — G62.9 NEUROPATHY: ICD-10-CM

## 2022-12-21 DIAGNOSIS — F90.2 ATTENTION DEFICIT HYPERACTIVITY DISORDER (ADHD), COMBINED TYPE: ICD-10-CM

## 2022-12-21 DIAGNOSIS — G47.09 OTHER INSOMNIA: ICD-10-CM

## 2022-12-21 RX ORDER — DULOXETIN HYDROCHLORIDE 60 MG/1
CAPSULE, DELAYED RELEASE ORAL
Qty: 60 CAPSULE | Refills: 0 | Status: SHIPPED | OUTPATIENT
Start: 2022-12-21 | End: 2023-01-31

## 2022-12-21 RX ORDER — ZOLPIDEM TARTRATE 5 MG/1
5 TABLET ORAL NIGHTLY PRN
Qty: 30 TABLET | Refills: 0 | Status: SHIPPED | OUTPATIENT
Start: 2022-12-21 | End: 2023-01-31

## 2022-12-21 RX ORDER — ZOLPIDEM TARTRATE 5 MG/1
5 TABLET ORAL NIGHTLY PRN
Qty: 30 TABLET | Refills: 0 | OUTPATIENT
Start: 2022-12-21

## 2022-12-21 RX ORDER — DULOXETIN HYDROCHLORIDE 60 MG/1
60 CAPSULE, DELAYED RELEASE ORAL 2 TIMES DAILY
Qty: 60 CAPSULE | Refills: 0 | OUTPATIENT
Start: 2022-12-21

## 2022-12-21 RX ORDER — OLANZAPINE 5 MG/1
TABLET ORAL
Qty: 30 TABLET | Refills: 0 | Status: SHIPPED | OUTPATIENT
Start: 2022-12-21 | End: 2023-01-31

## 2022-12-21 RX ORDER — HYDROXYZINE HYDROCHLORIDE 25 MG/1
TABLET, FILM COATED ORAL
Qty: 60 TABLET | Refills: 2 | Status: SHIPPED | OUTPATIENT
Start: 2022-12-21 | End: 2023-03-15 | Stop reason: SDUPTHER

## 2022-12-21 RX ORDER — DEXTROAMPHETAMINE SACCHARATE, AMPHETAMINE ASPARTATE, DEXTROAMPHETAMINE SULFATE AND AMPHETAMINE SULFATE 3.75; 3.75; 3.75; 3.75 MG/1; MG/1; MG/1; MG/1
15 TABLET ORAL DAILY
Qty: 30 TABLET | Refills: 0 | Status: SHIPPED | OUTPATIENT
Start: 2022-12-21 | End: 2023-02-02 | Stop reason: SDUPTHER

## 2022-12-21 RX ORDER — OLANZAPINE 5 MG/1
5 TABLET ORAL NIGHTLY
Qty: 30 TABLET | Refills: 0 | OUTPATIENT
Start: 2022-12-21

## 2022-12-21 RX ORDER — GABAPENTIN 300 MG/1
CAPSULE ORAL
Qty: 60 CAPSULE | Refills: 2 | Status: SHIPPED | OUTPATIENT
Start: 2022-12-21 | End: 2023-03-15 | Stop reason: SDUPTHER

## 2022-12-21 RX ORDER — GABAPENTIN ENACARBIL 600 MG/1
1 TABLET, EXTENDED RELEASE ORAL DAILY
Qty: 30 TABLET | Refills: 3 | Status: SHIPPED | OUTPATIENT
Start: 2022-12-21 | End: 2023-01-20 | Stop reason: SDUPTHER

## 2023-01-20 ENCOUNTER — OFFICE VISIT (OUTPATIENT)
Dept: FAMILY MEDICINE CLINIC | Facility: CLINIC | Age: 48
End: 2023-01-20
Payer: MEDICAID

## 2023-01-20 VITALS
HEART RATE: 82 BPM | BODY MASS INDEX: 26.5 KG/M2 | SYSTOLIC BLOOD PRESSURE: 112 MMHG | WEIGHT: 144 LBS | HEIGHT: 62 IN | DIASTOLIC BLOOD PRESSURE: 80 MMHG | OXYGEN SATURATION: 96 %

## 2023-01-20 DIAGNOSIS — G47.09 OTHER INSOMNIA: ICD-10-CM

## 2023-01-20 DIAGNOSIS — F31.62 BIPOLAR DISORDER, CURRENT EPISODE MIXED, MODERATE: ICD-10-CM

## 2023-01-20 DIAGNOSIS — F41.1 GENERALIZED ANXIETY DISORDER: Primary | ICD-10-CM

## 2023-01-20 DIAGNOSIS — F90.2 ATTENTION DEFICIT HYPERACTIVITY DISORDER (ADHD), COMBINED TYPE: ICD-10-CM

## 2023-01-20 DIAGNOSIS — G62.9 NEUROPATHY: ICD-10-CM

## 2023-01-20 PROCEDURE — 99214 OFFICE O/P EST MOD 30 MIN: CPT | Performed by: NURSE PRACTITIONER

## 2023-01-20 RX ORDER — GABAPENTIN ENACARBIL 600 MG/1
1 TABLET, EXTENDED RELEASE ORAL DAILY
Qty: 30 TABLET | Refills: 3 | Status: SHIPPED | OUTPATIENT
Start: 2023-01-20

## 2023-01-20 RX ORDER — ESCITALOPRAM OXALATE 10 MG/1
10 TABLET ORAL DAILY
Qty: 30 TABLET | Refills: 1 | Status: SHIPPED | OUTPATIENT
Start: 2023-01-20 | End: 2023-03-15 | Stop reason: SDUPTHER

## 2023-01-20 NOTE — PROGRESS NOTES
"Chief Complaint  Follow-up (3 month follow up bipolar )    Subjective        Patito Merritt presents to [facility]  History of Present Illness    Patient presents for follow-up visit.    Patient has history of bipolar disorder, mood is stable on Zyprexa and Cymbalta.  She is using hydroxyzine as needed for anxiety.  Patient takes Ambien nightly as needed for insomnia, symptoms reportedly stable.  Patient has ADHD, stable on Adderall.  Patient reports increasing depression over the last few weeks, previously on Lexapro. She is scheduled to see psychiatry in April.    Patient is prescribed Horizant and Gabapentin for neuropathy to right arm.  Patient also has history of RLS. She has had difficulty getting Horizant from the pharmacy. PA submitted and approved based on chart notes.     Objective   Vital Signs:  /80 (BP Location: Left arm, Patient Position: Sitting, Cuff Size: Adult)   Pulse 82   Ht 157.5 cm (62\")   Wt 65.3 kg (144 lb)   SpO2 96%   BMI 26.34 kg/m²   Estimated body mass index is 26.34 kg/m² as calculated from the following:    Height as of this encounter: 157.5 cm (62\").    Weight as of this encounter: 65.3 kg (144 lb).             Physical Exam  Constitutional:       Appearance: Normal appearance.   HENT:      Head: Normocephalic.   Cardiovascular:      Rate and Rhythm: Normal rate and regular rhythm.   Pulmonary:      Effort: Pulmonary effort is normal.      Breath sounds: Normal breath sounds.   Abdominal:      General: Abdomen is flat. Bowel sounds are normal.      Palpations: Abdomen is soft.   Musculoskeletal:         General: Normal range of motion.      Cervical back: Neck supple.      Right lower leg: No edema.      Left lower leg: No edema.   Skin:     General: Skin is warm and dry.   Neurological:      Mental Status: She is alert and oriented to person, place, and time.      Gait: Gait is intact.   Psychiatric:         Attention and Perception: " Attention normal.         Mood and Affect: Mood normal.         Speech: Speech normal.        Result Review :    CMP    CMP 2/14/22 9/12/22   Glucose 79 92   BUN 14 11   Creatinine 0.76 0.70   eGFR Non  Am 82    eGFR  108.8   Sodium 141 139   Potassium 4.2 4.4   Chloride 104 102   Calcium 9.7 9.2   Total Protein 7.6 6.8   Albumin 4.60 4.20   Globulin 3.0 2.6   Total Bilirubin 0.2 0.3   Alkaline Phosphatase 99 86   AST (SGOT) 13 12   ALT (SGPT) 13 12   Albumin/Globulin Ratio 1.5 1.6   BUN/Creatinine Ratio 18.4 15.7   Anion Gap 11.0 8.5      Comments are available for some flowsheets but are not being displayed.           CBC    CBC 2/14/22 9/12/22   WBC 10.27 7.93   RBC 4.79 4.68   Hemoglobin 14.9 13.9   Hematocrit 43.5 44.2   MCV 90.8 94.4   MCH 31.1 29.7   MCHC 34.3 31.4 (A)   RDW 13.2 14.1   Platelets 361 305   (A) Abnormal value            Lipid Panel    Lipid Panel 9/12/22   Total Cholesterol 234 (A)   Triglycerides 74   HDL Cholesterol 47   VLDL Cholesterol 13   LDL Cholesterol  174 (A)   LDL/HDL Ratio 3.66   (A) Abnormal value            TSH    TSH 9/12/22   TSH 1.620                        Assessment and Plan   Diagnoses and all orders for this visit:    1. Generalized anxiety disorder (Primary)  Assessment & Plan:  1.  Continue hydroxyzine as needed      2. Bipolar disorder, current episode mixed, moderate (HCC)  Assessment & Plan:  1.  Continue Zyprexa and Cymbalta  2.  Keep follow-up with psychiatry      3. Attention deficit hyperactivity disorder (ADHD), combined type  Assessment & Plan:  1.  Stable  2.  Continue Adderall as prescribed      4. Neuropathy  Assessment & Plan:  1.  Continue Horizant and gabapentin as needed  2.  If unable to get from pharmacy, will adjust gabapentin dose to make it routine      5. Other insomnia  Assessment & Plan:  1. Continue Ambien PRN       Other orders  -     escitalopram (Lexapro) 10 MG tablet; Take 1 tablet by mouth Daily.  Dispense: 30 tablet; Refill: 1  -      Gabapentin Enacarbil ER (Horizant) 600 MG tablet controlled-release; Take 600 mg by mouth Daily.  Dispense: 30 tablet; Refill: 3         I spent 30 minutes caring for Patito on this date of service. This time includes time spent by me in the following activities:preparing for the visit, reviewing tests, obtaining and/or reviewing a separately obtained history, performing a medically appropriate examination and/or evaluation , counseling and educating the patient/family/caregiver, ordering medications, tests, or procedures, documenting information in the medical record, independently interpreting results and communicating that information with the patient/family/caregiver and care coordination  Follow Up   Return in about 3 months (around 4/20/2023) for ADHD/Depression.  Patient was given instructions and counseling regarding her condition or for health maintenance advice. Please see specific information pulled into the AVS if appropriate.

## 2023-01-20 NOTE — ASSESSMENT & PLAN NOTE
1.  Restart Lexapro 10 mg daily  2.  Reviewed possible side effects with patient, she has tolerated medication regimen in the past

## 2023-01-20 NOTE — ASSESSMENT & PLAN NOTE
1.  Continue Horizant and gabapentin as needed  2.  If unable to get from pharmacy, will adjust gabapentin dose to make it routine

## 2023-01-31 DIAGNOSIS — G47.09 OTHER INSOMNIA: ICD-10-CM

## 2023-01-31 DIAGNOSIS — G62.9 NEUROPATHY: ICD-10-CM

## 2023-01-31 RX ORDER — ZOLPIDEM TARTRATE 5 MG/1
5 TABLET ORAL NIGHTLY PRN
Qty: 30 TABLET | Refills: 0 | Status: SHIPPED | OUTPATIENT
Start: 2023-01-31 | End: 2023-03-01 | Stop reason: SDUPTHER

## 2023-01-31 RX ORDER — OLANZAPINE 5 MG/1
TABLET ORAL
Qty: 30 TABLET | Refills: 0 | Status: SHIPPED | OUTPATIENT
Start: 2023-01-31 | End: 2023-03-15 | Stop reason: SDUPTHER

## 2023-01-31 RX ORDER — DULOXETIN HYDROCHLORIDE 60 MG/1
CAPSULE, DELAYED RELEASE ORAL
Qty: 60 CAPSULE | Refills: 0 | Status: SHIPPED | OUTPATIENT
Start: 2023-01-31 | End: 2023-03-15 | Stop reason: SDUPTHER

## 2023-02-02 DIAGNOSIS — F90.2 ATTENTION DEFICIT HYPERACTIVITY DISORDER (ADHD), COMBINED TYPE: ICD-10-CM

## 2023-02-02 RX ORDER — DEXTROAMPHETAMINE SACCHARATE, AMPHETAMINE ASPARTATE, DEXTROAMPHETAMINE SULFATE AND AMPHETAMINE SULFATE 3.75; 3.75; 3.75; 3.75 MG/1; MG/1; MG/1; MG/1
15 TABLET ORAL DAILY
Qty: 30 TABLET | Refills: 0 | Status: SHIPPED | OUTPATIENT
Start: 2023-02-02 | End: 2023-03-01 | Stop reason: SDUPTHER

## 2023-03-01 DIAGNOSIS — G47.09 OTHER INSOMNIA: ICD-10-CM

## 2023-03-01 DIAGNOSIS — F90.2 ATTENTION DEFICIT HYPERACTIVITY DISORDER (ADHD), COMBINED TYPE: ICD-10-CM

## 2023-03-01 RX ORDER — DEXTROAMPHETAMINE SACCHARATE, AMPHETAMINE ASPARTATE, DEXTROAMPHETAMINE SULFATE AND AMPHETAMINE SULFATE 3.75; 3.75; 3.75; 3.75 MG/1; MG/1; MG/1; MG/1
15 TABLET ORAL DAILY
Qty: 30 TABLET | Refills: 0 | Status: SHIPPED | OUTPATIENT
Start: 2023-03-01 | End: 2023-04-01 | Stop reason: SDUPTHER

## 2023-03-01 RX ORDER — ZOLPIDEM TARTRATE 5 MG/1
5 TABLET ORAL NIGHTLY PRN
Qty: 30 TABLET | Refills: 0 | Status: SHIPPED | OUTPATIENT
Start: 2023-03-01 | End: 2023-04-01 | Stop reason: SDUPTHER

## 2023-03-01 NOTE — TELEPHONE ENCOUNTER
Caller: Anastacia Merrittad HAAS    Relationship: Self    Best call back number: [phone]    Requested Prescriptions:   Requested Prescriptions     Pending Prescriptions Disp Refills   • zolpidem (AMBIEN) 5 MG tablet 30 tablet 0     Sig: Take 1 tablet by mouth At Night As Needed for Sleep.   • amphetamine-dextroamphetamine (Adderall) 15 MG tablet 30 tablet 0     Sig: Take 1 tablet by mouth Daily.        Pharmacy where request should be sent: [facility] [address] [place] [address] [phone] [facility] [phone] FX     Additional details provided by patient: PATIENT IS ALL OUT OF MEDICATION    Does the patient have less than a 3 day supply:  [x] Yes  [] No    Would you like a call back once the refill request has been completed: [x] Yes [] No    If the office needs to give you a call back, can they leave a voicemail: [x] Yes [] No    Jed Hope Rep   03/01/23 13:39 EST

## 2023-03-15 DIAGNOSIS — G62.9 NEUROPATHY: ICD-10-CM

## 2023-03-16 RX ORDER — HYDROXYZINE HYDROCHLORIDE 25 MG/1
TABLET, FILM COATED ORAL
Qty: 60 TABLET | Refills: 2 | Status: SHIPPED | OUTPATIENT
Start: 2023-03-16

## 2023-03-16 RX ORDER — DULOXETIN HYDROCHLORIDE 60 MG/1
60 CAPSULE, DELAYED RELEASE ORAL 2 TIMES DAILY
Qty: 60 CAPSULE | Refills: 0 | Status: SHIPPED | OUTPATIENT
Start: 2023-03-16

## 2023-03-16 RX ORDER — OLANZAPINE 5 MG/1
5 TABLET ORAL NIGHTLY
Qty: 30 TABLET | Refills: 0 | Status: SHIPPED | OUTPATIENT
Start: 2023-03-16

## 2023-03-16 RX ORDER — GABAPENTIN 300 MG/1
CAPSULE ORAL
Qty: 60 CAPSULE | Refills: 2 | Status: SHIPPED | OUTPATIENT
Start: 2023-03-16

## 2023-03-16 RX ORDER — ESCITALOPRAM OXALATE 10 MG/1
10 TABLET ORAL DAILY
Qty: 30 TABLET | Refills: 1 | Status: SHIPPED | OUTPATIENT
Start: 2023-03-16

## 2023-04-01 DIAGNOSIS — G47.09 OTHER INSOMNIA: ICD-10-CM

## 2023-04-01 DIAGNOSIS — F90.2 ATTENTION DEFICIT HYPERACTIVITY DISORDER (ADHD), COMBINED TYPE: ICD-10-CM

## 2023-04-03 RX ORDER — ZOLPIDEM TARTRATE 5 MG/1
5 TABLET ORAL NIGHTLY PRN
Qty: 30 TABLET | Refills: 0 | Status: SHIPPED | OUTPATIENT
Start: 2023-04-03

## 2023-04-03 RX ORDER — DEXTROAMPHETAMINE SACCHARATE, AMPHETAMINE ASPARTATE, DEXTROAMPHETAMINE SULFATE AND AMPHETAMINE SULFATE 3.75; 3.75; 3.75; 3.75 MG/1; MG/1; MG/1; MG/1
15 TABLET ORAL DAILY
Qty: 30 TABLET | Refills: 0 | Status: SHIPPED | OUTPATIENT
Start: 2023-04-03

## 2023-04-06 ENCOUNTER — OFFICE VISIT (OUTPATIENT)
Dept: PSYCHIATRY | Facility: CLINIC | Age: 48
End: 2023-04-06
Payer: MEDICAID

## 2023-04-06 DIAGNOSIS — F43.12 CHRONIC POSTTRAUMATIC STRESS DISORDER: Chronic | ICD-10-CM

## 2023-04-06 DIAGNOSIS — F31.81 BIPOLAR II DISORDER: Primary | Chronic | ICD-10-CM

## 2023-04-06 DIAGNOSIS — F90.2 ATTENTION DEFICIT HYPERACTIVITY DISORDER (ADHD), COMBINED TYPE: ICD-10-CM

## 2023-04-06 DIAGNOSIS — F41.1 GENERALIZED ANXIETY DISORDER: Chronic | ICD-10-CM

## 2023-04-06 PROBLEM — F51.04 CHRONIC INSOMNIA: Chronic | Status: ACTIVE | Noted: 2022-09-12

## 2023-04-06 PROBLEM — F32.A DEPRESSION: Status: RESOLVED | Noted: 2018-07-23 | Resolved: 2023-04-06

## 2023-04-06 PROCEDURE — 90792 PSYCH DIAG EVAL W/MED SRVCS: CPT | Performed by: PSYCHIATRY & NEUROLOGY

## 2023-04-06 PROCEDURE — 1160F RVW MEDS BY RX/DR IN RCRD: CPT | Performed by: PSYCHIATRY & NEUROLOGY

## 2023-04-06 PROCEDURE — 1159F MED LIST DOCD IN RCRD: CPT | Performed by: PSYCHIATRY & NEUROLOGY
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
## 2023-04-07 ENCOUNTER — PATIENT ROUNDING (BHMG ONLY) (OUTPATIENT)
Dept: PSYCHIATRY | Facility: CLINIC | Age: 48
End: 2023-04-07
Payer: MEDICAID

## 2023-04-07 NOTE — PROGRESS NOTES
A My-Chart message has been sent to the patient for PATIENT ROUNDING with [facility]

## 2023-04-17 ENCOUNTER — TELEPHONE (OUTPATIENT)
Dept: FAMILY MEDICINE CLINIC | Facility: CLINIC | Age: 48
End: 2023-04-17
Payer: MEDICAID

## 2023-04-17 NOTE — TELEPHONE ENCOUNTER
Caller: [facility] [address] [place] [address] - [address] [place] [address] - [phone] [facility] [phone] FX    Relationship: Pharmacy       What medications are you currently taking:   Current Outpatient Medications on File Prior to Visit   Medication Sig Dispense Refill   • amphetamine-dextroamphetamine (Adderall) 15 MG tablet Take 1 tablet by mouth Daily. 30 tablet 0   • DULoxetine (CYMBALTA) 60 MG capsule Take 1 capsule by mouth 2 (Two) Times a Day. 60 capsule 0   • escitalopram (Lexapro) 10 MG tablet Take 1 tablet by mouth Daily. 30 tablet 1   • Evening Primrose Oil 1000 MG capsule Take 1 each by mouth Daily.     • gabapentin (NEURONTIN) 300 MG capsule Take 1 capsulel  2x a day as needed for neuropathic pain 60 capsule 2   • Gabapentin Enacarbil ER (Horizant) 600 MG tablet controlled-release Take 600 mg by mouth Daily. 30 tablet 3   • hydrOXYzine (ATARAX) 25 MG tablet Take 1 tablet 2x a day as needed for acute anxiety 60 tablet 2   • OLANZapine (zyPREXA) 5 MG tablet Take 1 tablet by mouth Every Night. 30 tablet 0   • zolpidem (AMBIEN) 5 MG tablet Take 1 tablet by mouth At Night As Needed for Sleep. 30 tablet 0     No current facility-administered medications on file prior to visit.          Which medication are you concerned about:   PATIENT IS ON 2 GABAPENTIN:  Gabapentin Enacarbil ER (Horizant) 600 MG tablet controlled-release    gabapentin (NEURONTIN) 300 MG capsule      What are your concerns: HAIDER NEEDS JUSTIFICATION ON WHY PATIENT IS ON THESE 2 MEDICATIONS.   THERE WAS AN UPDATE ON [employer] THAT IS NOW FLAGGING MEDICATIONS IT DID NOT FLAG BEFORE  PLEASE ADVISE

## 2023-04-20 ENCOUNTER — OFFICE VISIT (OUTPATIENT)
Dept: PSYCHIATRY | Facility: CLINIC | Age: 48
End: 2023-04-20
Payer: MEDICAID

## 2023-04-20 DIAGNOSIS — F90.2 ATTENTION DEFICIT HYPERACTIVITY DISORDER (ADHD), COMBINED TYPE: ICD-10-CM

## 2023-04-20 DIAGNOSIS — F51.04 CHRONIC INSOMNIA: Chronic | ICD-10-CM

## 2023-04-20 DIAGNOSIS — F31.81 BIPOLAR II DISORDER: Primary | Chronic | ICD-10-CM

## 2023-04-20 DIAGNOSIS — F43.12 CHRONIC POSTTRAUMATIC STRESS DISORDER: Chronic | ICD-10-CM

## 2023-04-20 DIAGNOSIS — F41.1 GENERALIZED ANXIETY DISORDER: Chronic | ICD-10-CM

## 2023-04-20 RX ORDER — LUMATEPERONE 10.5 MG/1
1 CAPSULE ORAL NIGHTLY
Qty: 30 CAPSULE | Refills: 0 | Status: SHIPPED | OUTPATIENT
Start: 2023-04-20

## 2023-04-20 RX ORDER — LEVOMEFOLATE CALCIUM 15 MG
15 TABLET ORAL DAILY
Qty: 30 TABLET | Refills: 1 | Status: SHIPPED | OUTPATIENT
Start: 2023-04-20
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
## 2023-05-03 DIAGNOSIS — G47.09 OTHER INSOMNIA: ICD-10-CM

## 2023-05-03 DIAGNOSIS — F90.2 ATTENTION DEFICIT HYPERACTIVITY DISORDER (ADHD), COMBINED TYPE: ICD-10-CM

## 2023-05-03 RX ORDER — DEXTROAMPHETAMINE SACCHARATE, AMPHETAMINE ASPARTATE, DEXTROAMPHETAMINE SULFATE AND AMPHETAMINE SULFATE 3.75; 3.75; 3.75; 3.75 MG/1; MG/1; MG/1; MG/1
15 TABLET ORAL DAILY
Qty: 30 TABLET | Refills: 0 | Status: SHIPPED | OUTPATIENT
Start: 2023-05-03

## 2023-05-03 RX ORDER — ZOLPIDEM TARTRATE 5 MG/1
5 TABLET ORAL NIGHTLY PRN
Qty: 30 TABLET | Refills: 0 | Status: SHIPPED | OUTPATIENT
Start: 2023-05-03

## 2023-05-03 NOTE — TELEPHONE ENCOUNTER
Caller: Patito Merritt    Relationship: Self    Best call back number: [phone]    Requested Prescriptions:   Requested Prescriptions     Pending Prescriptions Disp Refills   • zolpidem (AMBIEN) 5 MG tablet [Pharmacy Med Name: Zolpidem Tartrate 5 MG Oral Tablet] 30 tablet 0     Sig: TAKE 1 TABLET BY MOUTH AT NIGHT AS NEEDED FOR SLEEP   • amphetamine-dextroamphetamine (Adderall) 15 MG tablet 30 tablet 0     Sig: Take 1 tablet by mouth Daily.        Pharmacy where request should be sent: [address] [phone] [facility] [phone]      Last office visit with prescribing clinician: 1/20/2023   Last telemedicine visit with prescribing clinician: 5/24/2023   Next office visit with prescribing clinician: 5/24/2023     Additional details provided by patient: PATIENT HAS 4 LEFT  ON HAND OF HER ADDERALL- 4 LEFT OF THE AMBIEN ALSO     Does the patient have less than a 3 day supply:  [] Yes  [x] No    Would you like a call back once the refill request has been completed: [x] Yes [] No    If the office needs to give you a call back, can they leave a voicemail: [x] Yes [] No    Mayi Grijalva, JORDIN   05/03/23 13:23 EDT

## 2023-05-15 ENCOUNTER — OFFICE VISIT (OUTPATIENT)
Dept: PSYCHIATRY | Facility: CLINIC | Age: 48
End: 2023-05-15
Payer: MEDICAID

## 2023-05-15 VITALS
DIASTOLIC BLOOD PRESSURE: 75 MMHG | HEART RATE: 73 BPM | SYSTOLIC BLOOD PRESSURE: 116 MMHG | WEIGHT: 146 LBS | OXYGEN SATURATION: 98 % | BODY MASS INDEX: 26.7 KG/M2

## 2023-05-15 DIAGNOSIS — F90.2 ATTENTION DEFICIT HYPERACTIVITY DISORDER (ADHD), COMBINED TYPE: Primary | ICD-10-CM

## 2023-05-15 DIAGNOSIS — F41.1 GENERALIZED ANXIETY DISORDER: Chronic | ICD-10-CM

## 2023-05-15 DIAGNOSIS — F31.81 BIPOLAR II DISORDER: Chronic | ICD-10-CM

## 2023-05-15 DIAGNOSIS — F43.12 CHRONIC POSTTRAUMATIC STRESS DISORDER: Chronic | ICD-10-CM

## 2023-05-15 PROCEDURE — 1159F MED LIST DOCD IN RCRD: CPT

## 2023-05-15 PROCEDURE — 1160F RVW MEDS BY RX/DR IN RCRD: CPT

## 2023-05-15 PROCEDURE — 99214 OFFICE O/P EST MOD 30 MIN: CPT
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
## 2023-05-17 DIAGNOSIS — F90.2 ATTENTION DEFICIT HYPERACTIVITY DISORDER (ADHD), COMBINED TYPE: ICD-10-CM

## 2023-05-18 RX ORDER — GABAPENTIN 300 MG/1
CAPSULE ORAL
Qty: 60 CAPSULE | Refills: 2 | Status: SHIPPED | OUTPATIENT
Start: 2023-05-18

## 2023-05-19 NOTE — PROGRESS NOTES
I have reviewed the notes, assessments, and/or procedures performed by Mary Mayer, I concur with her/his documentation of Patito Merritt.

## 2023-05-24 ENCOUNTER — OFFICE VISIT (OUTPATIENT)
Dept: FAMILY MEDICINE CLINIC | Facility: CLINIC | Age: 48
End: 2023-05-24
Payer: MEDICAID

## 2023-05-24 VITALS
SYSTOLIC BLOOD PRESSURE: 102 MMHG | HEART RATE: 89 BPM | BODY MASS INDEX: 26.5 KG/M2 | DIASTOLIC BLOOD PRESSURE: 60 MMHG | HEIGHT: 62 IN | WEIGHT: 144 LBS | OXYGEN SATURATION: 97 %

## 2023-05-24 DIAGNOSIS — F51.04 CHRONIC INSOMNIA: Chronic | ICD-10-CM

## 2023-05-24 DIAGNOSIS — G62.9 NEUROPATHY: ICD-10-CM

## 2023-05-24 DIAGNOSIS — F41.1 GENERALIZED ANXIETY DISORDER: Chronic | ICD-10-CM

## 2023-05-24 DIAGNOSIS — F90.2 ATTENTION DEFICIT HYPERACTIVITY DISORDER (ADHD), COMBINED TYPE: Primary | ICD-10-CM

## 2023-05-24 DIAGNOSIS — F31.81 BIPOLAR II DISORDER: Chronic | ICD-10-CM

## 2023-05-24 PROCEDURE — 1160F RVW MEDS BY RX/DR IN RCRD: CPT | Performed by: NURSE PRACTITIONER

## 2023-05-24 PROCEDURE — 1159F MED LIST DOCD IN RCRD: CPT | Performed by: NURSE PRACTITIONER

## 2023-05-24 PROCEDURE — 99213 OFFICE O/P EST LOW 20 MIN: CPT | Performed by: NURSE PRACTITIONER

## 2023-05-24 RX ORDER — GABAPENTIN 300 MG/1
300 CAPSULE ORAL 3 TIMES DAILY
Qty: 90 CAPSULE | Refills: 2 | Status: SHIPPED | OUTPATIENT
Start: 2023-05-24

## 2023-05-24 NOTE — PROGRESS NOTES
"Chief Complaint  Follow-up (3 month follow up ADHD/depression)    Subjective        Patito Merritt presents to [facility]  History of Present Illness    Patient presents for follow-up visit.    Patient has history of bipolar disorder, mood is stable on Caplyta and Lexapro.  She is using hydroxyzine as needed for anxiety.  Patient takes Ambien nightly as needed for insomnia, symptoms reportedly stable.  Patient has ADHD, stable on Vyvanse 30 mg daily. Adderall discontinued due to supply issues. Patient now followed by Psychiatry.     Patient is prescribed Horizant and Gabapentin (used PRN) for neuropathy to right arm.  Patient also has history of RLS. She reports left arm going numb at work. Patient does not feel symptoms well controlled.     Patient is c/o abnormal sensation to right ear. She denies pain, discharge, hearing loss.     Objective   Vital Signs:  /60 (BP Location: Left arm, Patient Position: Sitting, Cuff Size: Adult)   Pulse 89   Ht 157.5 cm (62\")   Wt 65.3 kg (144 lb)   SpO2 97%   BMI 26.34 kg/m²   Estimated body mass index is 26.34 kg/m² as calculated from the following:    Height as of this encounter: 157.5 cm (62\").    Weight as of this encounter: 65.3 kg (144 lb).             Physical Exam  Constitutional:       Appearance: Normal appearance.   HENT:      Head: Normocephalic.      Right Ear: Tympanic membrane normal.      Left Ear: Tympanic membrane normal.   Cardiovascular:      Rate and Rhythm: Normal rate and regular rhythm.   Pulmonary:      Effort: Pulmonary effort is normal.      Breath sounds: Normal breath sounds.   Abdominal:      General: Abdomen is flat. Bowel sounds are normal.      Palpations: Abdomen is soft.   Musculoskeletal:         General: Normal range of motion.      Cervical back: Neck supple.      Right lower leg: No edema.      Left lower leg: No edema.   Skin:     General: Skin is warm and dry.   Neurological:      Mental Status: " She is alert and oriented to person, place, and time.      Gait: Gait is intact.   Psychiatric:         Attention and Perception: Attention normal.         Mood and Affect: Mood normal.         Speech: Speech normal.        Result Review :    CMP        9/12/2022    09:47   CMP   Glucose 92     BUN 11     Creatinine 0.70     EGFR 108.8     Sodium 139     Potassium 4.4     Chloride 102     Calcium 9.2     Total Protein 6.8     Albumin 4.20     Globulin 2.6     Total Bilirubin 0.3     Alkaline Phosphatase 86     AST (SGOT) 12     ALT (SGPT) 12     Albumin/Globulin Ratio 1.6     BUN/Creatinine Ratio 15.7     Anion Gap 8.5       CBC        9/12/2022    09:47   CBC   WBC 7.93     RBC 4.68     Hemoglobin 13.9     Hematocrit 44.2     MCV 94.4     MCH 29.7     MCHC 31.4     RDW 14.1     Platelets 305       Lipid Panel        9/12/2022    09:47   Lipid Panel   Total Cholesterol 234     Triglycerides 74     HDL Cholesterol 47     VLDL Cholesterol 13     LDL Cholesterol  174     LDL/HDL Ratio 3.66       TSH        9/12/2022    09:47   TSH   TSH 1.620           Data reviewed: Consultant notes Psychiatry             Assessment and Plan   Diagnoses and all orders for this visit:    1. Attention deficit hyperactivity disorder (ADHD), combined type (Primary)  Assessment & Plan:  1.  Consultation notes reviewed  2.  Continue Vyvanse per psychiatry      2. Generalized anxiety disorder  Assessment & Plan:  1.  Lexapro and hydroxyzine per psychiatry      3. Bipolar II disorder  Assessment & Plan:  1.  Continue Caplyta per psychiatry      4. Neuropathy  Assessment & Plan:  1.  Discontinue Horizant  2.  Increase Neurontin to 300 mg 3 times daily  3.  Follow-up in 3 months, call sooner if needed      5. Chronic insomnia  Assessment & Plan:  1.  Continue Ambien nightly      Other orders  -     gabapentin (NEURONTIN) 300 MG capsule; Take 1 capsule by mouth 3 (Three) Times a Day.  Dispense: 90 capsule; Refill: 2         I spent 25 minutes  caring for Patito on this date of service. This time includes time spent by me in the following activities:preparing for the visit, reviewing tests, obtaining and/or reviewing a separately obtained history, performing a medically appropriate examination and/or evaluation , counseling and educating the patient/family/caregiver, ordering medications, tests, or procedures, documenting information in the medical record and care coordination  Follow Up   Return in about 3 months (around 8/24/2023) for Neuropathy.  Patient was given instructions and counseling regarding her condition or for health maintenance advice. Please see specific information pulled into the AVS if appropriate.       Answers for HPI/ROS submitted by the patient on 5/17/2023  Please describe your symptoms.: Med changes, mostly same symptoms as usual, Want to talk about bladder issues  Have you had these symptoms before?: Yes  How long have you been having these symptoms?: 1-2 weeks  Please list any medications you are currently taking for this condition.: See list of medications  Please describe any probable cause for these symptoms. : Past injuries, med changes,  What is the primary reason for your visit?: Other

## 2023-05-24 NOTE — ASSESSMENT & PLAN NOTE
1.  Discontinue Horizant  2.  Increase Neurontin to 300 mg 3 times daily  3.  Follow-up in 3 months, call sooner if needed

## 2023-05-25 DIAGNOSIS — F31.81 BIPOLAR II DISORDER: Chronic | ICD-10-CM

## 2023-05-25 NOTE — TELEPHONE ENCOUNTER
Patient states she needs refill on the Caplyta.    Rx Refill Note  Requested Prescriptions     Pending Prescriptions Disp Refills   • Lumateperone Tosylate (Caplyta) 10.5 MG capsule 30 capsule 0     Sig: Take 1 capsule by mouth Every Night.      Last office visit with prescribing clinician: 5/15/2023   Next office visit with prescribing clinician: 6/19/2023   {  [facility] Full Urine Drug Screen - (05/15/2023)      Rianna Shukla MA  05/25/23, 13:44 EDT

## 2023-05-30 RX ORDER — LUMATEPERONE 10.5 MG/1
1 CAPSULE ORAL NIGHTLY
Qty: 30 CAPSULE | Refills: 0 | Status: SHIPPED | OUTPATIENT
Start: 2023-05-30

## 2023-06-12 DIAGNOSIS — G47.09 OTHER INSOMNIA: ICD-10-CM

## 2023-06-12 DIAGNOSIS — F90.2 ATTENTION DEFICIT HYPERACTIVITY DISORDER (ADHD), COMBINED TYPE: ICD-10-CM

## 2023-06-12 RX ORDER — GABAPENTIN 300 MG/1
300 CAPSULE ORAL 3 TIMES DAILY
Qty: 90 CAPSULE | Refills: 2 | Status: SHIPPED | OUTPATIENT
Start: 2023-06-12

## 2023-06-12 RX ORDER — ZOLPIDEM TARTRATE 5 MG/1
5 TABLET ORAL NIGHTLY PRN
Qty: 30 TABLET | Refills: 0 | Status: SHIPPED | OUTPATIENT
Start: 2023-06-12

## 2023-06-12 NOTE — TELEPHONE ENCOUNTER
Rx Refill Note  Requested Prescriptions     Pending Prescriptions Disp Refills    lisdexamfetamine (Vyvanse) 30 MG capsule 30 capsule 0     Sig: Take 1 capsule by mouth Every Morning      Last office visit with prescribing clinician: 5/15/2023   Next office visit with prescribing clinician: 6/19/2023     Progress Notes by Mary Mayer APRN (05/15/2023 10:15)     [facility] Full Urine Drug Screen - (05/15/2023)     Med check - 6-    Inspect - 5-    Rianna Shukla MA  06/12/23, 16:25 EDT

## 2023-06-15 DIAGNOSIS — F90.2 ATTENTION DEFICIT HYPERACTIVITY DISORDER (ADHD), COMBINED TYPE: ICD-10-CM

## 2023-06-15 RX ORDER — GABAPENTIN 300 MG/1
300 CAPSULE ORAL 3 TIMES DAILY
Qty: 90 CAPSULE | Refills: 2 | OUTPATIENT
Start: 2023-06-15

## 2023-06-19 ENCOUNTER — OFFICE VISIT (OUTPATIENT)
Dept: PSYCHIATRY | Facility: CLINIC | Age: 48
End: 2023-06-19
Payer: MEDICAID

## 2023-06-19 DIAGNOSIS — F43.12 CHRONIC POSTTRAUMATIC STRESS DISORDER: ICD-10-CM

## 2023-06-19 DIAGNOSIS — F31.81 BIPOLAR II DISORDER: ICD-10-CM

## 2023-06-19 DIAGNOSIS — F51.04 CHRONIC INSOMNIA: Primary | ICD-10-CM

## 2023-06-19 DIAGNOSIS — F41.1 GENERALIZED ANXIETY DISORDER: ICD-10-CM

## 2023-06-19 RX ORDER — LEVOMEFOLATE CALCIUM 15 MG
15 TABLET ORAL DAILY
Qty: 30 TABLET | Refills: 1 | Status: SHIPPED | OUTPATIENT
Start: 2023-06-19
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
## 2023-07-06 ENCOUNTER — TELEPHONE (OUTPATIENT)
Dept: FAMILY MEDICINE CLINIC | Facility: CLINIC | Age: 48
End: 2023-07-06

## 2023-07-06 NOTE — TELEPHONE ENCOUNTER
Caller: Patito Merritt    Relationship to patient: Self    Best call back number: [phone]    Patient is needing:     WANTED TO SEE IF THE MEDICATION     Lumateperone Tosylate (Caplyta) 10.5 MG capsule      COULD BE CALLED IN TODAY AS SHE IS OUT OF THE MEDICATION.

## 2023-07-31 ENCOUNTER — OFFICE VISIT (OUTPATIENT)
Dept: PSYCHIATRY | Facility: CLINIC | Age: 48
End: 2023-07-31
Payer: MEDICAID

## 2023-07-31 VITALS
BODY MASS INDEX: 26.34 KG/M2 | DIASTOLIC BLOOD PRESSURE: 69 MMHG | SYSTOLIC BLOOD PRESSURE: 104 MMHG | WEIGHT: 144 LBS | HEART RATE: 89 BPM | OXYGEN SATURATION: 100 %

## 2023-07-31 DIAGNOSIS — F31.81 BIPOLAR II DISORDER: Chronic | ICD-10-CM

## 2023-07-31 DIAGNOSIS — F51.04 CHRONIC INSOMNIA: Primary | ICD-10-CM

## 2023-07-31 PROCEDURE — 1160F RVW MEDS BY RX/DR IN RCRD: CPT

## 2023-07-31 PROCEDURE — 1159F MED LIST DOCD IN RCRD: CPT

## 2023-07-31 PROCEDURE — 99214 OFFICE O/P EST MOD 30 MIN: CPT

## 2023-07-31 RX ORDER — LUMATEPERONE 21 MG/1
21 CAPSULE ORAL NIGHTLY
Qty: 30 CAPSULE | Refills: 1 | Status: SHIPPED | OUTPATIENT
Start: 2023-07-31

## 2023-08-15 DIAGNOSIS — G47.09 OTHER INSOMNIA: ICD-10-CM

## 2023-08-15 DIAGNOSIS — F90.2 ATTENTION DEFICIT HYPERACTIVITY DISORDER (ADHD), COMBINED TYPE: ICD-10-CM

## 2023-08-15 RX ORDER — ESCITALOPRAM OXALATE 10 MG/1
10 TABLET ORAL DAILY
Qty: 30 TABLET | Refills: 1 | Status: SHIPPED | OUTPATIENT
Start: 2023-08-15

## 2023-08-15 RX ORDER — ZOLPIDEM TARTRATE 5 MG/1
5 TABLET ORAL NIGHTLY PRN
Qty: 30 TABLET | Refills: 0 | Status: SHIPPED | OUTPATIENT
Start: 2023-08-15
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
## 2023-08-28 ENCOUNTER — OFFICE VISIT (OUTPATIENT)
Dept: FAMILY MEDICINE CLINIC | Facility: CLINIC | Age: 48
End: 2023-08-28
Payer: MEDICAID

## 2023-08-28 ENCOUNTER — OFFICE VISIT (OUTPATIENT)
Dept: PSYCHIATRY | Facility: CLINIC | Age: 48
End: 2023-08-28
Payer: MEDICAID

## 2023-08-28 VITALS
WEIGHT: 140 LBS | OXYGEN SATURATION: 97 % | HEIGHT: 62 IN | DIASTOLIC BLOOD PRESSURE: 80 MMHG | HEART RATE: 81 BPM | BODY MASS INDEX: 25.76 KG/M2 | SYSTOLIC BLOOD PRESSURE: 122 MMHG

## 2023-08-28 DIAGNOSIS — F31.81 BIPOLAR II DISORDER: Chronic | ICD-10-CM

## 2023-08-28 DIAGNOSIS — F41.1 GENERALIZED ANXIETY DISORDER: Primary | Chronic | ICD-10-CM

## 2023-08-28 DIAGNOSIS — F90.2 ATTENTION DEFICIT HYPERACTIVITY DISORDER (ADHD), COMBINED TYPE: ICD-10-CM

## 2023-08-28 DIAGNOSIS — F31.81 BIPOLAR II DISORDER: ICD-10-CM

## 2023-08-28 DIAGNOSIS — F41.1 GENERALIZED ANXIETY DISORDER: ICD-10-CM

## 2023-08-28 DIAGNOSIS — F43.12 CHRONIC POSTTRAUMATIC STRESS DISORDER: ICD-10-CM

## 2023-08-28 DIAGNOSIS — Z00.00 PREVENTATIVE HEALTH CARE: ICD-10-CM

## 2023-08-28 DIAGNOSIS — F43.12 CHRONIC POSTTRAUMATIC STRESS DISORDER: Primary | ICD-10-CM

## 2023-08-28 DIAGNOSIS — G62.9 NEUROPATHY: ICD-10-CM

## 2023-08-28 LAB
DEPRECATED RDW RBC AUTO: 44.5 FL (ref 37–54)
ERYTHROCYTE [DISTWIDTH] IN BLOOD BY AUTOMATED COUNT: 13.1 % (ref 12.3–15.4)
HCT VFR BLD AUTO: 42.7 % (ref 34–46.6)
HGB BLD-MCNC: 14 G/DL (ref 12–15.9)
MCH RBC QN AUTO: 30.8 PG (ref 26.6–33)
MCHC RBC AUTO-ENTMCNC: 32.8 G/DL (ref 31.5–35.7)
MCV RBC AUTO: 93.8 FL (ref 79–97)
PLATELET # BLD AUTO: 321 10*3/MM3 (ref 140–450)
PMV BLD AUTO: 10.9 FL (ref 6–12)
RBC # BLD AUTO: 4.55 10*6/MM3 (ref 3.77–5.28)
WBC NRBC COR # BLD: 10.7 10*3/MM3 (ref 3.4–10.8)

## 2023-08-28 PROCEDURE — 80050 GENERAL HEALTH PANEL: CPT | Performed by: NURSE PRACTITIONER

## 2023-08-28 PROCEDURE — 80061 LIPID PANEL: CPT | Performed by: NURSE PRACTITIONER

## 2023-08-28 PROCEDURE — 83036 HEMOGLOBIN GLYCOSYLATED A1C: CPT | Performed by: NURSE PRACTITIONER

## 2023-08-28 RX ORDER — GABAPENTIN 600 MG/1
600 TABLET ORAL 3 TIMES DAILY
Qty: 90 TABLET | Refills: 0 | Status: SHIPPED | OUTPATIENT
Start: 2023-08-28 | End: 2023-09-27

## 2023-08-28 NOTE — PROGRESS NOTES
"Chief Complaint  Follow-up (3 month follow up ADHD/neuropathy )    Subjective        Patito Merritt presents to [facility]  History of Present Illness    Patient presents for f/u visit.     Patient is prescribed Gabapentin 300 mg TID for neuropathy to right arm, previously on Horizant.  Patient also has history of RLS. She reports left arm numbness at work. Patient reports mild control of pain - she did take 900 mg BID x 1 day and felt this was effective.     Patient has history of bipolar disorder, mood is stable on Caplyta and Lexapro.  She is using hydroxyzine as needed for anxiety.  Patient takes Ambien nightly as needed for insomnia, symptoms reportedly stable.  Patient has ADHD, stable on Vyvanse 30 mg daily. Patient now followed by Psychiatry.     Objective   Vital Signs:  /80 (BP Location: Left arm, Patient Position: Sitting, Cuff Size: Adult)   Pulse 81   Ht 157.5 cm (62\")   Wt 63.5 kg (140 lb)   SpO2 97%   BMI 25.61 kg/mý   Estimated body mass index is 25.61 kg/mý as calculated from the following:    Height as of this encounter: 157.5 cm (62\").    Weight as of this encounter: 63.5 kg (140 lb).       BMI is >= 25 and <30. (Overweight) The following options were offered after discussion;: exercise counseling/recommendations and nutrition counseling/recommendations      Physical Exam  Constitutional:       Appearance: Normal appearance.   HENT:      Head: Normocephalic.   Cardiovascular:      Rate and Rhythm: Normal rate and regular rhythm.   Pulmonary:      Effort: Pulmonary effort is normal.      Breath sounds: Normal breath sounds.   Abdominal:      General: Abdomen is flat. Bowel sounds are normal.      Palpations: Abdomen is soft.   Musculoskeletal:         General: Normal range of motion.      Cervical back: Neck supple.      Right lower leg: No edema.      Left lower leg: No edema.   Skin:     General: Skin is warm and dry.   Neurological:      Mental Status: " She is alert and oriented to person, place, and time.      Gait: Gait is intact.   Psychiatric:         Attention and Perception: Attention normal.         Mood and Affect: Mood normal.         Speech: Speech normal.      Result Review :    CMP          9/12/2022    09:47   CMP   Glucose 92    BUN 11    Creatinine 0.70    EGFR 108.8    Sodium 139    Potassium 4.4    Chloride 102    Calcium 9.2    Total Protein 6.8    Albumin 4.20    Globulin 2.6    Total Bilirubin 0.3    Alkaline Phosphatase 86    AST (SGOT) 12    ALT (SGPT) 12    Albumin/Globulin Ratio 1.6    BUN/Creatinine Ratio 15.7    Anion Gap 8.5      CBC          9/12/2022    09:47   CBC   WBC 7.93    RBC 4.68    Hemoglobin 13.9    Hematocrit 44.2    MCV 94.4    MCH 29.7    MCHC 31.4    RDW 14.1    Platelets 305      Lipid Panel          9/12/2022    09:47   Lipid Panel   Total Cholesterol 234    Triglycerides 74    HDL Cholesterol 47    VLDL Cholesterol 13    LDL Cholesterol  174    LDL/HDL Ratio 3.66      TSH          9/12/2022    09:47   TSH   TSH 1.620                     Assessment and Plan   Diagnoses and all orders for this visit:    1. Generalized anxiety disorder (Primary)    2. Attention deficit hyperactivity disorder (ADHD), combined type    3. Preventative health care  -     CBC (No Diff)  -     Comprehensive Metabolic Panel  -     Hemoglobin A1c  -     Lipid Panel  -     TSH    4. Neuropathy  Assessment & Plan:  Increase gabapentin to 600 mg 3 times daily  Follow-up in 3 months      5. Bipolar II disorder  Assessment & Plan:  Continue medications as prescribed per psychiatry      Other orders  -     gabapentin (NEURONTIN) 600 MG tablet; Take 1 tablet by mouth 3 (Three) Times a Day for 30 days.  Dispense: 90 tablet; Refill: 0           I spent 30 minutes caring for Patito on this date of service. This time includes time spent by me in the following activities:preparing for the visit, reviewing tests, obtaining and/or reviewing a separately  obtained history, performing a medically appropriate examination and/or evaluation , counseling and educating the patient/family/caregiver, ordering medications, tests, or procedures, documenting information in the medical record, and care coordination  Follow Up   Return in about 3 months (around 11/28/2023) for Neuropathy.  Patient was given instructions and counseling regarding her condition or for health maintenance advice. Please see specific information pulled into the AVS if appropriate.

## 2023-08-28 NOTE — TELEPHONE ENCOUNTER
Rx Refill Note  Requested Prescriptions     Pending Prescriptions Disp Refills    l-methylfolate 15 MG tablet tablet 30 tablet 1     Sig: Take 1 tablet by mouth Daily.      Last office visit with prescribing clinician: 7/31/2023     Next office visit with prescribing clinician: 9/11/2023     Office Visit with Mary Mayer APRN (07/31/2023)     Med check - 9-    Rianna Shukla MA  08/28/23, 16:02 EDT

## 2023-08-28 NOTE — PROGRESS NOTES
Venipuncture Blood Specimen Collection  Venipuncture performed in the left arm by Donna Bradley MA with good hemostasis. Patient tolerated the procedure well without complications.   08/28/23   Donna Bradley MA

## 2023-08-29 LAB
ALBUMIN SERPL-MCNC: 4.4 G/DL (ref 3.5–5.2)
ALBUMIN/GLOB SERPL: 1.9 G/DL
ALP SERPL-CCNC: 84 U/L (ref 39–117)
ALT SERPL W P-5'-P-CCNC: 11 U/L (ref 1–33)
ANION GAP SERPL CALCULATED.3IONS-SCNC: 9 MMOL/L (ref 5–15)
AST SERPL-CCNC: 12 U/L (ref 1–32)
BILIRUB SERPL-MCNC: <0.2 MG/DL (ref 0–1.2)
BUN SERPL-MCNC: 13 MG/DL (ref 6–20)
BUN/CREAT SERPL: 19.7 (ref 7–25)
CALCIUM SPEC-SCNC: 9.9 MG/DL (ref 8.6–10.5)
CHLORIDE SERPL-SCNC: 106 MMOL/L (ref 98–107)
CHOLEST SERPL-MCNC: 178 MG/DL (ref 0–200)
CO2 SERPL-SCNC: 28 MMOL/L (ref 22–29)
CREAT SERPL-MCNC: 0.66 MG/DL (ref 0.57–1)
EGFRCR SERPLBLD CKD-EPI 2021: 109.7 ML/MIN/1.73
GLOBULIN UR ELPH-MCNC: 2.3 GM/DL
GLUCOSE SERPL-MCNC: 67 MG/DL (ref 65–99)
HBA1C MFR BLD: 5.7 % (ref 4.8–5.6)
HDLC SERPL-MCNC: 41 MG/DL (ref 40–60)
LDLC SERPL CALC-MCNC: 116 MG/DL (ref 0–100)
LDLC/HDLC SERPL: 2.78 {RATIO}
POTASSIUM SERPL-SCNC: 4.6 MMOL/L (ref 3.5–5.2)
PROT SERPL-MCNC: 6.7 G/DL (ref 6–8.5)
SODIUM SERPL-SCNC: 143 MMOL/L (ref 136–145)
TRIGL SERPL-MCNC: 115 MG/DL (ref 0–150)
TSH SERPL DL<=0.05 MIU/L-ACNC: 1.25 UIU/ML (ref 0.27–4.2)
VLDLC SERPL-MCNC: 21 MG/DL (ref 5–40)

## 2023-08-29 RX ORDER — LEVOMEFOLATE CALCIUM 15 MG
15 TABLET ORAL DAILY
Qty: 30 TABLET | Refills: 1 | Status: SHIPPED | OUTPATIENT
Start: 2023-08-29
